# Patient Record
Sex: FEMALE | Race: BLACK OR AFRICAN AMERICAN | NOT HISPANIC OR LATINO | Employment: FULL TIME | URBAN - METROPOLITAN AREA
[De-identification: names, ages, dates, MRNs, and addresses within clinical notes are randomized per-mention and may not be internally consistent; named-entity substitution may affect disease eponyms.]

---

## 2021-04-13 ENCOUNTER — OFFICE VISIT (OUTPATIENT)
Dept: OBSTETRICS AND GYNECOLOGY | Facility: CLINIC | Age: 31
End: 2021-04-13
Payer: MEDICAID

## 2021-04-13 VITALS
DIASTOLIC BLOOD PRESSURE: 84 MMHG | WEIGHT: 293 LBS | BODY MASS INDEX: 47.09 KG/M2 | HEIGHT: 66 IN | SYSTOLIC BLOOD PRESSURE: 118 MMHG

## 2021-04-13 DIAGNOSIS — R87.612 LOW GRADE SQUAMOUS INTRAEPITHELIAL LESION ON CYTOLOGIC SMEAR OF CERVIX (LGSIL): Primary | ICD-10-CM

## 2021-04-13 LAB
B-HCG UR QL: NEGATIVE
CTP QC/QA: YES

## 2021-04-13 PROCEDURE — 88305 TISSUE EXAM BY PATHOLOGIST: ICD-10-PCS | Mod: 26,,, | Performed by: PATHOLOGY

## 2021-04-13 PROCEDURE — 88305 TISSUE EXAM BY PATHOLOGIST: CPT | Mod: 26,,, | Performed by: PATHOLOGY

## 2021-04-13 PROCEDURE — 99499 NO LOS: ICD-10-PCS | Mod: S$GLB,,, | Performed by: OBSTETRICS & GYNECOLOGY

## 2021-04-13 PROCEDURE — 57455 BIOPSY OF CERVIX W/SCOPE: CPT | Mod: S$GLB,,, | Performed by: OBSTETRICS & GYNECOLOGY

## 2021-04-13 PROCEDURE — 88305 TISSUE EXAM BY PATHOLOGIST: CPT | Performed by: PATHOLOGY

## 2021-04-13 PROCEDURE — 99499 UNLISTED E&M SERVICE: CPT | Mod: S$GLB,,, | Performed by: OBSTETRICS & GYNECOLOGY

## 2021-04-13 PROCEDURE — 88342 CHG IMMUNOCYTOCHEMISTRY: ICD-10-PCS | Mod: 26,,, | Performed by: PATHOLOGY

## 2021-04-13 PROCEDURE — 81025 URINE PREGNANCY TEST: CPT | Mod: S$GLB,,, | Performed by: OBSTETRICS & GYNECOLOGY

## 2021-04-13 PROCEDURE — 88342 IMHCHEM/IMCYTCHM 1ST ANTB: CPT | Performed by: PATHOLOGY

## 2021-04-13 PROCEDURE — 57455 PR COLPOSCOPY,CERVIX W/ADJ VAGINA,W/BX: ICD-10-PCS | Mod: S$GLB,,, | Performed by: OBSTETRICS & GYNECOLOGY

## 2021-04-13 PROCEDURE — 88342 IMHCHEM/IMCYTCHM 1ST ANTB: CPT | Mod: 26,,, | Performed by: PATHOLOGY

## 2021-04-13 PROCEDURE — 81025 POCT URINE PREGNANCY: ICD-10-PCS | Mod: S$GLB,,, | Performed by: OBSTETRICS & GYNECOLOGY

## 2021-04-19 ENCOUNTER — TELEPHONE (OUTPATIENT)
Dept: OBSTETRICS AND GYNECOLOGY | Facility: CLINIC | Age: 31
End: 2021-04-19

## 2021-04-19 LAB
FINAL PATHOLOGIC DIAGNOSIS: NORMAL
GROSS: NORMAL
Lab: NORMAL

## 2021-04-29 ENCOUNTER — TELEPHONE (OUTPATIENT)
Dept: OBSTETRICS AND GYNECOLOGY | Facility: CLINIC | Age: 31
End: 2021-04-29

## 2021-04-29 RX ORDER — FLUCONAZOLE 150 MG/1
TABLET ORAL
Qty: 2 TABLET | Refills: 1 | Status: SHIPPED | OUTPATIENT
Start: 2021-04-29 | End: 2021-04-30 | Stop reason: SDUPTHER

## 2021-04-30 RX ORDER — FLUCONAZOLE 150 MG/1
TABLET ORAL
Qty: 2 TABLET | Refills: 1 | Status: SHIPPED | OUTPATIENT
Start: 2021-04-30 | End: 2021-06-01 | Stop reason: SDUPTHER

## 2021-05-26 ENCOUNTER — PROCEDURE VISIT (OUTPATIENT)
Dept: OBSTETRICS AND GYNECOLOGY | Facility: CLINIC | Age: 31
End: 2021-05-26
Payer: MEDICAID

## 2021-05-26 VITALS
HEIGHT: 70 IN | SYSTOLIC BLOOD PRESSURE: 120 MMHG | DIASTOLIC BLOOD PRESSURE: 76 MMHG | BODY MASS INDEX: 41.95 KG/M2 | WEIGHT: 293 LBS

## 2021-05-26 DIAGNOSIS — D06.9 HIGH GRADE SQUAMOUS INTRAEPITHELIAL LESION (HGSIL), GRADE 3 CIN, ON BIOPSY OF CERVIX: Primary | ICD-10-CM

## 2021-05-26 LAB
B-HCG UR QL: NEGATIVE
CTP QC/QA: YES

## 2021-05-26 PROCEDURE — 81025 POCT URINE PREGNANCY: ICD-10-PCS | Mod: S$GLB,,, | Performed by: OBSTETRICS & GYNECOLOGY

## 2021-05-26 PROCEDURE — 88307 TISSUE EXAM BY PATHOLOGIST: CPT | Mod: 26,,, | Performed by: PATHOLOGY

## 2021-05-26 PROCEDURE — 81025 URINE PREGNANCY TEST: CPT | Mod: S$GLB,,, | Performed by: OBSTETRICS & GYNECOLOGY

## 2021-05-26 PROCEDURE — 57461 PR COLPOSCOPY,CERVIX W/ADJ VAG,W/LOOP CONIZ: ICD-10-PCS | Mod: S$GLB,,, | Performed by: OBSTETRICS & GYNECOLOGY

## 2021-05-26 PROCEDURE — 88307 PR  SURG PATH,LEVEL V: ICD-10-PCS | Mod: 26,,, | Performed by: PATHOLOGY

## 2021-05-26 PROCEDURE — 88307 TISSUE EXAM BY PATHOLOGIST: CPT | Mod: 59 | Performed by: PATHOLOGY

## 2021-05-26 PROCEDURE — 99499 NO LOS: ICD-10-PCS | Mod: S$GLB,,, | Performed by: OBSTETRICS & GYNECOLOGY

## 2021-05-26 PROCEDURE — 57461 CONZ OF CERVIX W/SCOPE LEEP: CPT | Mod: S$GLB,,, | Performed by: OBSTETRICS & GYNECOLOGY

## 2021-05-26 PROCEDURE — 99499 UNLISTED E&M SERVICE: CPT | Mod: S$GLB,,, | Performed by: OBSTETRICS & GYNECOLOGY

## 2021-05-26 RX ORDER — LEVONORGESTREL AND ETHINYL ESTRADIOL 0.1-0.02MG
1 KIT ORAL DAILY
Qty: 90 TABLET | Refills: 3 | Status: SHIPPED | OUTPATIENT
Start: 2021-05-26 | End: 2022-07-05 | Stop reason: SDUPTHER

## 2021-05-31 LAB
COMMENT: NORMAL
FINAL PATHOLOGIC DIAGNOSIS: NORMAL
GROSS: NORMAL
Lab: NORMAL

## 2021-06-01 ENCOUNTER — TELEPHONE (OUTPATIENT)
Dept: OBSTETRICS AND GYNECOLOGY | Facility: CLINIC | Age: 31
End: 2021-06-01

## 2021-06-01 RX ORDER — FLUCONAZOLE 150 MG/1
TABLET ORAL
Qty: 2 TABLET | Refills: 1 | Status: SHIPPED | OUTPATIENT
Start: 2021-06-01 | End: 2022-07-05 | Stop reason: SDUPTHER

## 2021-06-03 ENCOUNTER — OFFICE VISIT (OUTPATIENT)
Dept: OBSTETRICS AND GYNECOLOGY | Facility: CLINIC | Age: 31
End: 2021-06-03
Payer: MEDICAID

## 2021-06-03 VITALS
HEIGHT: 70 IN | BODY MASS INDEX: 41.95 KG/M2 | SYSTOLIC BLOOD PRESSURE: 116 MMHG | WEIGHT: 293 LBS | DIASTOLIC BLOOD PRESSURE: 74 MMHG

## 2021-06-03 DIAGNOSIS — N76.0 ACUTE VAGINITIS: Primary | ICD-10-CM

## 2021-06-03 PROCEDURE — 99213 PR OFFICE/OUTPT VISIT, EST, LEVL III, 20-29 MIN: ICD-10-PCS | Mod: S$GLB,,, | Performed by: NURSE PRACTITIONER

## 2021-06-03 PROCEDURE — 99213 OFFICE O/P EST LOW 20 MIN: CPT | Mod: S$GLB,,, | Performed by: NURSE PRACTITIONER

## 2021-06-03 PROCEDURE — 87591 N.GONORRHOEAE DNA AMP PROB: CPT | Performed by: NURSE PRACTITIONER

## 2021-06-03 PROCEDURE — 87481 CANDIDA DNA AMP PROBE: CPT | Mod: 59 | Performed by: NURSE PRACTITIONER

## 2021-06-03 PROCEDURE — 87491 CHLMYD TRACH DNA AMP PROBE: CPT | Mod: 59 | Performed by: NURSE PRACTITIONER

## 2021-06-03 RX ORDER — CLINDAMYCIN HYDROCHLORIDE 300 MG/1
300 CAPSULE ORAL 2 TIMES DAILY
Qty: 14 CAPSULE | Refills: 0 | Status: SHIPPED | OUTPATIENT
Start: 2021-06-03 | End: 2022-07-05 | Stop reason: SDUPTHER

## 2021-06-04 LAB
BACTERIAL VAGINOSIS DNA: POSITIVE
C TRACH DNA SPEC QL NAA+PROBE: NOT DETECTED
CANDIDA GLABRATA DNA: NEGATIVE
CANDIDA KRUSEI DNA: NEGATIVE
CANDIDA RRNA VAG QL PROBE: NEGATIVE
N GONORRHOEA DNA SPEC QL NAA+PROBE: NOT DETECTED
T VAGINALIS RRNA GENITAL QL PROBE: NEGATIVE

## 2021-06-25 ENCOUNTER — CLINICAL SUPPORT (OUTPATIENT)
Dept: OBSTETRICS AND GYNECOLOGY | Facility: CLINIC | Age: 31
End: 2021-06-25
Payer: MEDICAID

## 2021-06-25 DIAGNOSIS — R87.618 PAP SMEAR ABNORMALITY OF CERVIX/HUMAN PAPILLOMAVIRUS (HPV) POSITIVE: Primary | ICD-10-CM

## 2021-06-25 PROCEDURE — 90471 IMMUNIZATION ADMIN: CPT | Mod: S$GLB,,, | Performed by: OBSTETRICS & GYNECOLOGY

## 2021-06-25 PROCEDURE — 90651 PR HPV/ HUMAN PAPILLOMA VACC, 9-VAL(PF) 0.5 ML IM: ICD-10-PCS | Mod: S$GLB,,, | Performed by: OBSTETRICS & GYNECOLOGY

## 2021-06-25 PROCEDURE — 90651 9VHPV VACCINE 2/3 DOSE IM: CPT | Mod: S$GLB,,, | Performed by: OBSTETRICS & GYNECOLOGY

## 2021-06-25 PROCEDURE — 90471 PR IMMUNIZ ADMIN,1 SINGLE/COMB VAC/TOXOID: ICD-10-PCS | Mod: S$GLB,,, | Performed by: OBSTETRICS & GYNECOLOGY

## 2022-07-06 ENCOUNTER — OFFICE VISIT (OUTPATIENT)
Dept: OBSTETRICS AND GYNECOLOGY | Facility: CLINIC | Age: 32
End: 2022-07-06
Payer: MEDICAID

## 2022-07-06 ENCOUNTER — PATIENT MESSAGE (OUTPATIENT)
Dept: OBSTETRICS AND GYNECOLOGY | Facility: CLINIC | Age: 32
End: 2022-07-06
Payer: MEDICAID

## 2022-07-06 VITALS — HEIGHT: 69 IN | BODY MASS INDEX: 41.77 KG/M2 | WEIGHT: 282 LBS

## 2022-07-06 DIAGNOSIS — R10.2 PELVIC PAIN: ICD-10-CM

## 2022-07-06 DIAGNOSIS — N76.0 ACUTE VAGINITIS: Primary | ICD-10-CM

## 2022-07-06 PROCEDURE — 87481 CANDIDA DNA AMP PROBE: CPT | Mod: 59 | Performed by: NURSE PRACTITIONER

## 2022-07-06 PROCEDURE — 1160F RVW MEDS BY RX/DR IN RCRD: CPT | Mod: CPTII,S$GLB,, | Performed by: NURSE PRACTITIONER

## 2022-07-06 PROCEDURE — 87801 DETECT AGNT MULT DNA AMPLI: CPT | Performed by: NURSE PRACTITIONER

## 2022-07-06 PROCEDURE — 3008F PR BODY MASS INDEX (BMI) DOCUMENTED: ICD-10-PCS | Mod: CPTII,S$GLB,, | Performed by: NURSE PRACTITIONER

## 2022-07-06 PROCEDURE — 87491 CHLMYD TRACH DNA AMP PROBE: CPT | Mod: 59 | Performed by: NURSE PRACTITIONER

## 2022-07-06 PROCEDURE — 1159F PR MEDICATION LIST DOCUMENTED IN MEDICAL RECORD: ICD-10-PCS | Mod: CPTII,S$GLB,, | Performed by: NURSE PRACTITIONER

## 2022-07-06 PROCEDURE — 87591 N.GONORRHOEAE DNA AMP PROB: CPT | Mod: 59 | Performed by: NURSE PRACTITIONER

## 2022-07-06 PROCEDURE — 99212 OFFICE O/P EST SF 10 MIN: CPT | Mod: S$GLB,,, | Performed by: NURSE PRACTITIONER

## 2022-07-06 PROCEDURE — 1160F PR REVIEW ALL MEDS BY PRESCRIBER/CLIN PHARMACIST DOCUMENTED: ICD-10-PCS | Mod: CPTII,S$GLB,, | Performed by: NURSE PRACTITIONER

## 2022-07-06 PROCEDURE — 99212 PR OFFICE/OUTPT VISIT, EST, LEVL II, 10-19 MIN: ICD-10-PCS | Mod: S$GLB,,, | Performed by: NURSE PRACTITIONER

## 2022-07-06 PROCEDURE — 3008F BODY MASS INDEX DOCD: CPT | Mod: CPTII,S$GLB,, | Performed by: NURSE PRACTITIONER

## 2022-07-06 PROCEDURE — 1159F MED LIST DOCD IN RCRD: CPT | Mod: CPTII,S$GLB,, | Performed by: NURSE PRACTITIONER

## 2022-07-06 NOTE — PROGRESS NOTES
"CC: Vaginal discharge    Shawanda Dunn is a 31 y.o. female  presents with complaint of vaginal discharge for 2 weeks.  She denies itching.  denies odor.  She states the discharge is white and creamy.  Report pelvic pain that feels like "contractions in my vagina" onset beginning of this year.  Nothing makes the pain better or worse.     Past Medical History:   Diagnosis Date    Abnormal Pap smear of cervix      Past Surgical History:   Procedure Laterality Date    CERVICAL BIOPSY  W/ LOOP ELECTRODE EXCISION       Social History     Tobacco Use    Smoking status: Never Smoker    Smokeless tobacco: Never Used   Substance Use Topics    Alcohol use: Not Currently    Drug use: Never     Family History   Problem Relation Age of Onset    Diabetes Paternal Grandfather     Diabetes Paternal Grandmother      OB History    Para Term  AB Living   4 4       4   SAB IAB Ectopic Multiple Live Births                  # Outcome Date GA Lbr Fernandez/2nd Weight Sex Delivery Anes PTL Lv   4 Para            3 Para            2 Para            1 Para                Ht 5' 9" (1.753 m)   Wt 127.9 kg (282 lb)   LMP 2022   BMI 41.64 kg/m²     Review of Systems   Genitourinary: Positive for pelvic pain and vaginal discharge.   All other systems reviewed and are negative.       PHYSICAL EXAM:  normal external genitalia, vulva, vagina, cervix, uterus and adnexa, VAGINA: normal appearing vagina with normal color and discharge, no lesions, vaginal discharge - white, creamy and malodorous, DNA probe for chlamydia and GC obtained, vaginosis swab obtained, exam chaperoned by MARCELO Martin LPN.     ASSESSMENT and PLAN:    ICD-10-CM ICD-9-CM    1. Acute vaginitis  N76.0 616.10 C. trachomatis/N. gonorrhoeae by AMP DNA [facility]; Cervicovaginal      Vaginosis Screen by DNA Probe   2. Pelvic pain  R10.2 [id number] C. trachomatis/N. gonorrhoeae by AMP DNA [facility]; Cervicovaginal      Vaginosis Screen by DNA Probe "         Patient was counseled today on vaginitis prevention including :  a. avoiding feminine products such as deoderant soaps, body wash, bubble bath, douches, scented toilet paper, deoderant tampons or pads, feminine wipes, chronic pad use, etc.  b. avoiding other vulvovaginal irritants such as long hot baths, humidity, tight, synthetic clothing, chlorine and sitting around in wet bathing suits  c. wearing cotton underwear, avoiding thong underwear and no underwear to bed  d. taking showers instead of baths and use a hair dryer on cool setting afterwards to dry  e. wearing cotton to exercise and shower immediately after exercise and change clothes  f. using polyurethane condoms without spermicide if sexually active and symptoms are triggered by intercourse    Cultures done above.  Medication has been sent in by A Kenzie NP on 7/5/22 for yeast and BV.  Encouraged to RTC for annual exam with pap soon.  RTC if symptoms worsen or do not resolve.     Priscilla Hart, FNP-C

## 2022-07-06 NOTE — TELEPHONE ENCOUNTER
Pt came up to [facility] to request medication and reported that she was having pains and abnormal  Discharge.  I informed the Pt that there will be a wait to get an appointment with .  Pt opted to see  this afternoon.   notified.

## 2022-07-08 LAB
BACTERIAL VAGINOSIS DNA: POSITIVE
CANDIDA GLABRATA DNA: NEGATIVE
CANDIDA KRUSEI DNA: NEGATIVE
CANDIDA RRNA VAG QL PROBE: NEGATIVE
T VAGINALIS RRNA GENITAL QL PROBE: NEGATIVE

## 2022-07-10 LAB
C TRACH DNA SPEC QL NAA+PROBE: NOT DETECTED
N GONORRHOEA DNA SPEC QL NAA+PROBE: NOT DETECTED

## 2022-07-22 ENCOUNTER — OFFICE VISIT (OUTPATIENT)
Dept: OBSTETRICS AND GYNECOLOGY | Facility: CLINIC | Age: 32
End: 2022-07-22
Payer: MEDICAID

## 2022-07-22 VITALS
WEIGHT: 286 LBS | BODY MASS INDEX: 42.36 KG/M2 | DIASTOLIC BLOOD PRESSURE: 90 MMHG | HEIGHT: 69 IN | SYSTOLIC BLOOD PRESSURE: 124 MMHG

## 2022-07-22 DIAGNOSIS — Z01.419 WELL WOMAN EXAM: Primary | ICD-10-CM

## 2022-07-22 DIAGNOSIS — Z11.3 SCREENING EXAMINATION FOR STD (SEXUALLY TRANSMITTED DISEASE): ICD-10-CM

## 2022-07-22 PROCEDURE — 87624 HPV HI-RISK TYP POOLED RSLT: CPT | Performed by: NURSE PRACTITIONER

## 2022-07-22 PROCEDURE — 99395 PR PREVENTIVE VISIT,EST,18-39: ICD-10-PCS | Mod: S$GLB,,, | Performed by: NURSE PRACTITIONER

## 2022-07-22 PROCEDURE — 1159F MED LIST DOCD IN RCRD: CPT | Mod: CPTII,S$GLB,, | Performed by: NURSE PRACTITIONER

## 2022-07-22 PROCEDURE — 88141 CYTOPATH C/V INTERPRET: CPT | Mod: ,,, | Performed by: PATHOLOGY

## 2022-07-22 PROCEDURE — 3008F BODY MASS INDEX DOCD: CPT | Mod: CPTII,S$GLB,, | Performed by: NURSE PRACTITIONER

## 2022-07-22 PROCEDURE — 1160F RVW MEDS BY RX/DR IN RCRD: CPT | Mod: CPTII,S$GLB,, | Performed by: NURSE PRACTITIONER

## 2022-07-22 PROCEDURE — 3074F PR MOST RECENT SYSTOLIC BLOOD PRESSURE < 130 MM HG: ICD-10-PCS | Mod: CPTII,S$GLB,, | Performed by: NURSE PRACTITIONER

## 2022-07-22 PROCEDURE — 3080F DIAST BP >= 90 MM HG: CPT | Mod: CPTII,S$GLB,, | Performed by: NURSE PRACTITIONER

## 2022-07-22 PROCEDURE — 3080F PR MOST RECENT DIASTOLIC BLOOD PRESSURE >= 90 MM HG: ICD-10-PCS | Mod: CPTII,S$GLB,, | Performed by: NURSE PRACTITIONER

## 2022-07-22 PROCEDURE — 1160F PR REVIEW ALL MEDS BY PRESCRIBER/CLIN PHARMACIST DOCUMENTED: ICD-10-PCS | Mod: CPTII,S$GLB,, | Performed by: NURSE PRACTITIONER

## 2022-07-22 PROCEDURE — 88141 PR  CYTOPATH CERV/VAG INTERPRET: ICD-10-PCS | Mod: ,,, | Performed by: PATHOLOGY

## 2022-07-22 PROCEDURE — 99395 PREV VISIT EST AGE 18-39: CPT | Mod: S$GLB,,, | Performed by: NURSE PRACTITIONER

## 2022-07-22 PROCEDURE — 3008F PR BODY MASS INDEX (BMI) DOCUMENTED: ICD-10-PCS | Mod: CPTII,S$GLB,, | Performed by: NURSE PRACTITIONER

## 2022-07-22 PROCEDURE — 88175 CYTOPATH C/V AUTO FLUID REDO: CPT | Performed by: PATHOLOGY

## 2022-07-22 PROCEDURE — 3074F SYST BP LT 130 MM HG: CPT | Mod: CPTII,S$GLB,, | Performed by: NURSE PRACTITIONER

## 2022-07-22 PROCEDURE — 1159F PR MEDICATION LIST DOCUMENTED IN MEDICAL RECORD: ICD-10-PCS | Mod: CPTII,S$GLB,, | Performed by: NURSE PRACTITIONER

## 2022-07-22 NOTE — PROGRESS NOTES
CC: Well woman exam    Shawanda Dunn is a 31 y.o. female  presents for well woman exam.  LMP: Patient's last menstrual period was 2022..  No issues, problems, or complaints. Patients last pap was ; LGSIL.  Last wellness labs were done unsure will scheduled. She is a non smoker . Denies any anxiety and depression. She uses a seat belt while riding in the car.  Eating well and exercising.  yes sexually active.The current method of family planning is none. Requests blood work for STD testing as well.        Chief Complaint   Patient presents with    Annual Exam     Pt is here for her annual. Pt Last Pap was in . Pt states she also wants to do a pregnancy test.        Past Medical History:   Diagnosis Date    Abnormal Pap smear of cervix      Past Surgical History:   Procedure Laterality Date    CERVICAL BIOPSY  W/ LOOP ELECTRODE EXCISION       Social History     Socioeconomic History    Marital status: Single   Tobacco Use    Smoking status: Never Smoker    Smokeless tobacco: Never Used   Substance and Sexual Activity    Alcohol use: Not Currently    Drug use: Never    Sexual activity: Yes     Family History   Problem Relation Age of Onset    Diabetes Paternal Grandfather     Diabetes Paternal Grandmother      OB History        4    Para   4    Term                AB        Living   4       SAB        IAB        Ectopic        Multiple        Live Births   4               Current Outpatient Medications on File Prior to Visit   Medication Sig Dispense Refill    clindamycin (CLEOCIN) 300 MG capsule Take 1 capsule (300 mg total) by mouth 2 (two) times daily. (Patient not taking: No sig reported) 14 capsule 0    fluconazole (DIFLUCAN) 150 MG Tab 1 po Now may repeat in 72 hr (Patient not taking: No sig reported) 2 tablet 1    levonorgestrel-ethinyl estradiol (AVIANE,ALESSE,LESSINA) 0.1-20 mg-mcg per tablet Take 1 tablet by mouth once daily. (Patient not taking: Reported on  "6/3/2021) 90 tablet 3     No current facility-administered medications on file prior to visit.        ROS:  Review of Systems   Constitutional: Negative.    HENT: Negative.    Eyes: Negative.    Respiratory: Negative.    Cardiovascular: Negative.    Gastrointestinal: Negative.    Endocrine: Negative.    Genitourinary: Negative.    Musculoskeletal: Negative.    Integumentary:  Negative.   Neurological: Negative.    Hematological: Negative.    Psychiatric/Behavioral: Negative.    All other systems reviewed and are negative.  Breast: negative.         BP (!) 124/90   Ht 5' 9" (1.753 m)   Wt 129.7 kg (286 lb)   LMP 06/25/2022   BMI 42.23 kg/m²     PHYSICAL EXAM:  Physical Exam:   Constitutional: She is oriented to person, place, and time. Vital signs are normal. She appears well-developed and well-nourished.    HENT:   Head: Normocephalic and atraumatic.   Nose: Nose normal.   Mouth/Throat: Oropharynx is clear and moist.    Eyes: Pupils are equal, round, and reactive to light. Conjunctivae and EOM are normal.     Cardiovascular: Normal rate, regular rhythm, normal heart sounds, intact distal pulses and normal pulses.     Pulmonary/Chest: Effort normal and breath sounds normal.        Abdominal: Soft. Bowel sounds are normal.     Genitourinary:    Inguinal canal, vagina, uterus, right adnexa, left adnexa and rectum normal.      Pelvic exam was performed with patient supine.   The external female genitalia was normal.   Genitalia hair distrobution normal .   Labial bartholins normal.Cervix is normal.    pap smear completed          Musculoskeletal: Normal range of motion and moves all extremeties.      Right lower leg: No edema.      Left lower leg: No edema.       Neurological: She is alert and oriented to person, place, and time. She has normal reflexes.    Skin: Skin is warm and dry.    Psychiatric: She has a normal mood and affect. Her behavior is normal. Judgment and thought content normal.        Well woman " exam  -     HPV High Risk Genotypes, PCR  -     Liquid-Based Pap Smear, Screening  -     Lipid Panel; Future; Expected date: 07/22/2022  -     Glucose, Random; Future; Expected date: 07/22/2022  -     POCT urine pregnancy    Screening examination for STD (sexually transmitted disease)  -     HIV 1/2 Ag/Ab (4th Gen); Future; Expected date: 07/22/2022  -     RPR; Future; Expected date: 07/22/2022  -     Hepatitis Panel, Acute; Future; Expected date: 07/22/2022      Orders Placed This Encounter   Procedures    HPV High Risk Genotypes, PCR     Order Specific Question:   Source     Answer:   Cervix     Order Specific Question:   Release to patient     Answer:   Immediate    HIV 1/2 Ag/Ab (4th Gen)     Standing Status:   Future     Standing Expiration Date:   9/20/2023     Order Specific Question:   Release to patient     Answer:   Immediate    RPR     Standing Status:   Future     Standing Expiration Date:   9/20/2023     Order Specific Question:   Release to patient     Answer:   Immediate    Hepatitis Panel, Acute     Standing Status:   Future     Standing Expiration Date:   9/20/2023    Lipid Panel     Standing Status:   Future     Standing Expiration Date:   7/22/2023    Glucose, Random     Standing Status:   Future     Standing Expiration Date:   7/22/2023    POCT urine pregnancy        Patient was counseled today on A.C.S. Pap guidelines and recommendations for yearly pelvic exams, mammograms and monthly self breast exams; to see her PCP for other health maintenance.  Contraception was discussed with the patient she expressed understanding.  STD education counseling was performed during this visit patient expressed understanding. RTC for annual in 1 year or before for any GYN problems/concerns.     No follow-ups on file.      ROGELIO McduffieC

## 2022-07-28 LAB
FINAL PATHOLOGIC DIAGNOSIS: ABNORMAL
HPV HR 12 DNA SPEC QL NAA+PROBE: POSITIVE
HPV16 AG SPEC QL: NEGATIVE
HPV18 DNA SPEC QL NAA+PROBE: NEGATIVE
Lab: ABNORMAL

## 2022-07-29 ENCOUNTER — LAB VISIT (OUTPATIENT)
Dept: LAB | Facility: CLINIC | Age: 32
End: 2022-07-29
Payer: MEDICAID

## 2022-07-29 DIAGNOSIS — Z01.419 WELL WOMAN EXAM: ICD-10-CM

## 2022-07-29 DIAGNOSIS — Z11.3 SCREENING EXAMINATION FOR STD (SEXUALLY TRANSMITTED DISEASE): ICD-10-CM

## 2022-07-29 LAB
CHOLEST SERPL-MCNC: 163 MG/DL (ref 120–199)
CHOLEST/HDLC SERPL: 3.7 {RATIO} (ref 2–5)
GLUCOSE SERPL-MCNC: 87 MG/DL (ref 70–110)
HDLC SERPL-MCNC: 44 MG/DL (ref 40–75)
HDLC SERPL: 27 % (ref 20–50)
LDLC SERPL CALC-MCNC: 106.4 MG/DL (ref 63–159)
NONHDLC SERPL-MCNC: 119 MG/DL
TRIGL SERPL-MCNC: 63 MG/DL (ref 30–150)

## 2022-07-29 PROCEDURE — 36415 COLL VENOUS BLD VENIPUNCTURE: CPT | Mod: ,,, | Performed by: STUDENT IN AN ORGANIZED HEALTH CARE EDUCATION/TRAINING PROGRAM

## 2022-07-29 PROCEDURE — 87389 HIV-1 AG W/HIV-1&-2 AB AG IA: CPT | Performed by: NURSE PRACTITIONER

## 2022-07-29 PROCEDURE — 86592 SYPHILIS TEST NON-TREP QUAL: CPT | Performed by: NURSE PRACTITIONER

## 2022-07-29 PROCEDURE — 36415 PR COLLECTION VENOUS BLOOD,VENIPUNCTURE: ICD-10-PCS | Mod: ,,, | Performed by: STUDENT IN AN ORGANIZED HEALTH CARE EDUCATION/TRAINING PROGRAM

## 2022-07-29 PROCEDURE — 80061 LIPID PANEL: CPT | Performed by: NURSE PRACTITIONER

## 2022-07-29 PROCEDURE — 80074 ACUTE HEPATITIS PANEL: CPT | Performed by: NURSE PRACTITIONER

## 2022-07-29 PROCEDURE — 82947 ASSAY GLUCOSE BLOOD QUANT: CPT | Performed by: NURSE PRACTITIONER

## 2022-07-30 LAB — RPR SER QL: NORMAL

## 2022-08-01 LAB — HIV 1+2 AB+HIV1 P24 AG SERPL QL IA: NEGATIVE

## 2022-08-02 LAB
HAV IGM SERPL QL IA: NEGATIVE
HBV CORE IGM SERPL QL IA: NEGATIVE
HBV SURFACE AG SERPL QL IA: NEGATIVE
HCV AB SERPL QL IA: NEGATIVE

## 2023-03-27 ENCOUNTER — OFFICE VISIT (OUTPATIENT)
Dept: OBSTETRICS AND GYNECOLOGY | Facility: CLINIC | Age: 33
End: 2023-03-27
Payer: COMMERCIAL

## 2023-03-27 VITALS
HEART RATE: 68 BPM | DIASTOLIC BLOOD PRESSURE: 85 MMHG | SYSTOLIC BLOOD PRESSURE: 134 MMHG | WEIGHT: 282 LBS | BODY MASS INDEX: 41.77 KG/M2 | HEIGHT: 69 IN

## 2023-03-27 DIAGNOSIS — R10.2 VAGINAL PAIN: ICD-10-CM

## 2023-03-27 DIAGNOSIS — R10.2 PELVIC PAIN: ICD-10-CM

## 2023-03-27 DIAGNOSIS — Z11.3 SCREENING EXAMINATION FOR STD (SEXUALLY TRANSMITTED DISEASE): Primary | ICD-10-CM

## 2023-03-27 PROCEDURE — 87591 N.GONORRHOEAE DNA AMP PROB: CPT | Performed by: NURSE PRACTITIONER

## 2023-03-27 PROCEDURE — 99213 OFFICE O/P EST LOW 20 MIN: CPT | Mod: S$GLB,,, | Performed by: NURSE PRACTITIONER

## 2023-03-27 PROCEDURE — 3075F PR MOST RECENT SYSTOLIC BLOOD PRESS GE 130-139MM HG: ICD-10-PCS | Mod: CPTII,S$GLB,, | Performed by: NURSE PRACTITIONER

## 2023-03-27 PROCEDURE — 3079F PR MOST RECENT DIASTOLIC BLOOD PRESSURE 80-89 MM HG: ICD-10-PCS | Mod: CPTII,S$GLB,, | Performed by: NURSE PRACTITIONER

## 2023-03-27 PROCEDURE — 3075F SYST BP GE 130 - 139MM HG: CPT | Mod: CPTII,S$GLB,, | Performed by: NURSE PRACTITIONER

## 2023-03-27 PROCEDURE — 1159F MED LIST DOCD IN RCRD: CPT | Mod: CPTII,S$GLB,, | Performed by: NURSE PRACTITIONER

## 2023-03-27 PROCEDURE — 3008F BODY MASS INDEX DOCD: CPT | Mod: CPTII,S$GLB,, | Performed by: NURSE PRACTITIONER

## 2023-03-27 PROCEDURE — 1159F PR MEDICATION LIST DOCUMENTED IN MEDICAL RECORD: ICD-10-PCS | Mod: CPTII,S$GLB,, | Performed by: NURSE PRACTITIONER

## 2023-03-27 PROCEDURE — 99213 PR OFFICE/OUTPT VISIT, EST, LEVL III, 20-29 MIN: ICD-10-PCS | Mod: S$GLB,,, | Performed by: NURSE PRACTITIONER

## 2023-03-27 PROCEDURE — 1160F RVW MEDS BY RX/DR IN RCRD: CPT | Mod: CPTII,S$GLB,, | Performed by: NURSE PRACTITIONER

## 2023-03-27 PROCEDURE — 3008F PR BODY MASS INDEX (BMI) DOCUMENTED: ICD-10-PCS | Mod: CPTII,S$GLB,, | Performed by: NURSE PRACTITIONER

## 2023-03-27 PROCEDURE — 3079F DIAST BP 80-89 MM HG: CPT | Mod: CPTII,S$GLB,, | Performed by: NURSE PRACTITIONER

## 2023-03-27 PROCEDURE — 81514 NFCT DS BV&VAGINITIS DNA ALG: CPT | Performed by: NURSE PRACTITIONER

## 2023-03-27 PROCEDURE — 1160F PR REVIEW ALL MEDS BY PRESCRIBER/CLIN PHARMACIST DOCUMENTED: ICD-10-PCS | Mod: CPTII,S$GLB,, | Performed by: NURSE PRACTITIONER

## 2023-03-27 NOTE — PROGRESS NOTES
"CC: STD testing    Shawanda Dunn is a 32 y.o. female  presents for STD testing.  LMP: Patient's last menstrual period was 03/15/2023 (exact date)..  Patient does not  have a prior history of STDs.  She states her partner   unsure  have a known current infection.      Patient does have current symptoms.  Vaginal discharge and vaginal pain ("feels like spasms where I had been cut for delivery")    /85   Pulse 68   Ht 5' 9" (1.753 m)   Wt 127.9 kg (282 lb)   LMP 03/15/2023 (Exact Date)   BMI 41.64 kg/m²       ROS:  GENERAL: Denies fever, weight gain or weight loss. Feeling well overall.   SKIN: Denies rash or lesions.   NODES: Denies enlarged lymph nodes.   CHEST: Denies chest pain or shortness of breath.   CARDIOVASCULAR: Denies palpitations or left sided chest pain.   ABDOMEN: No abdominal pain, constipation, diarrhea, nausea, vomiting or rectal bleeding.   URINARY: No frequency, dysuria, hematuria, or burning on urination.  REPRODUCTIVE: See HPI.  Denies pelvic pain.  MUSCULOSKELETAL: Denies joint pain or swelling.   NEUROLOGIC: Denies syncope or weakness.   PSYCHIATRIC: Denies depression, anxiety or mood swings.    PHYSICAL EXAM:  APPEARANCE: Well nourished, well developed, in no acute distress.  AFFECT: WNL, alert and oriented x 3  SKIN: No rashes  NODES: No inguinal, cervical, axillary, or femoral lymph node enlargement  ABDOMEN: Soft.  No tenderness or masses.  No hepatosplenomegaly.  No hernias.  PELVIC: Normal external genitalia without lesions.  Normal hair distribution.  Adequate perineal body, normal urethral meatus.  Vagina moist and well rugated without lesions or discharge.  Cervix pink, without lesions, discharge or tenderness.  No significant cystocele or rectocele.  Bimanual exam shows uterus to be normal size, regular, mobile and nontender.  Adnexa without masses or tenderness.    EXTREMITIES: No edema.    PLAN:  Screening examination for STD (sexually transmitted disease)  -     " C. trachomatis/N. gonorrhoeae by AMP DNA [facility]; Cervicovaginal  -     Vaginosis Screen by DNA Probe  -     HIV 1/2 Ag/Ab (4th Gen); Future; Expected date: 03/27/2023  -     RPR; Future; Expected date: 03/27/2023  -     Hepatitis Panel, Acute; Future; Expected date: 03/27/2023    Vaginal pain  -     US OB/GYN Procedure ([facility]); Future; Expected date: 03/27/2023    Pelvic pain  -     US OB/GYN Procedure ([facility]); Future; Expected date: 03/27/2023            Discussed available STD testing.  Patient was counseled today regarding STD prevention.  Precautions given to follow-up if symptoms change.  Recommended condom use and repeat testing in 3-6 mos.  Will get above labs and cultures.  Will get US to evaluate for abnormalities.  Consider follow up with MD if cultures are negative and pain/discomfort continues.      No follow-ups on file.     Priscilla Hart, RODRIGOP-C

## 2023-03-28 ENCOUNTER — PATIENT MESSAGE (OUTPATIENT)
Dept: OBSTETRICS AND GYNECOLOGY | Facility: CLINIC | Age: 33
End: 2023-03-28
Payer: MEDICAID

## 2023-03-28 DIAGNOSIS — B37.31 YEAST VAGINITIS: ICD-10-CM

## 2023-03-28 DIAGNOSIS — B96.89 BV (BACTERIAL VAGINOSIS): Primary | ICD-10-CM

## 2023-03-28 DIAGNOSIS — N76.0 BV (BACTERIAL VAGINOSIS): Primary | ICD-10-CM

## 2023-03-28 LAB
BACTERIAL VAGINOSIS DNA: POSITIVE
C TRACH DNA SPEC QL NAA+PROBE: NOT DETECTED
CANDIDA GLABRATA DNA: NEGATIVE
CANDIDA KRUSEI DNA: NEGATIVE
CANDIDA RRNA VAG QL PROBE: POSITIVE
N GONORRHOEA DNA SPEC QL NAA+PROBE: NOT DETECTED
T VAGINALIS RRNA GENITAL QL PROBE: NEGATIVE

## 2023-03-28 RX ORDER — FLUCONAZOLE 150 MG/1
TABLET ORAL
Qty: 2 TABLET | Refills: 2 | Status: SHIPPED | OUTPATIENT
Start: 2023-03-28 | End: 2023-04-10

## 2023-03-28 RX ORDER — METRONIDAZOLE 500 MG/1
500 TABLET ORAL 2 TIMES DAILY
Qty: 14 TABLET | Refills: 0 | Status: SHIPPED | OUTPATIENT
Start: 2023-03-28 | End: 2023-04-04

## 2023-04-06 ENCOUNTER — PROCEDURE VISIT (OUTPATIENT)
Dept: MATERNAL FETAL MEDICINE | Facility: CLINIC | Age: 33
End: 2023-04-06
Payer: COMMERCIAL

## 2023-04-06 DIAGNOSIS — R10.2 PELVIC PAIN: ICD-10-CM

## 2023-04-06 DIAGNOSIS — R10.2 VAGINAL PAIN: ICD-10-CM

## 2023-04-06 PROCEDURE — 76856 US EXAM PELVIC COMPLETE: CPT | Mod: S$GLB,,, | Performed by: OBSTETRICS & GYNECOLOGY

## 2023-04-06 PROCEDURE — 76830 TRANSVAGINAL US NON-OB: CPT | Mod: S$GLB,,, | Performed by: OBSTETRICS & GYNECOLOGY

## 2023-04-06 PROCEDURE — 76856 US OB/GYN PROCEDURE (VIEWPOINT): ICD-10-PCS | Mod: S$GLB,,, | Performed by: OBSTETRICS & GYNECOLOGY

## 2023-04-06 PROCEDURE — 76830 US OB/GYN PROCEDURE (VIEWPOINT): ICD-10-PCS | Mod: S$GLB,,, | Performed by: OBSTETRICS & GYNECOLOGY

## 2023-04-07 ENCOUNTER — PATIENT MESSAGE (OUTPATIENT)
Dept: OBSTETRICS AND GYNECOLOGY | Facility: CLINIC | Age: 33
End: 2023-04-07
Payer: MEDICAID

## 2023-04-10 ENCOUNTER — OFFICE VISIT (OUTPATIENT)
Dept: OBSTETRICS AND GYNECOLOGY | Facility: CLINIC | Age: 33
End: 2023-04-10
Payer: COMMERCIAL

## 2023-04-10 VITALS
WEIGHT: 280 LBS | BODY MASS INDEX: 41.47 KG/M2 | SYSTOLIC BLOOD PRESSURE: 136 MMHG | HEIGHT: 69 IN | DIASTOLIC BLOOD PRESSURE: 82 MMHG

## 2023-04-10 DIAGNOSIS — R10.2 PELVIC PAIN: ICD-10-CM

## 2023-04-10 DIAGNOSIS — D25.9 UTERINE LEIOMYOMA, UNSPECIFIED LOCATION: Primary | ICD-10-CM

## 2023-04-10 PROCEDURE — 3008F BODY MASS INDEX DOCD: CPT | Mod: CPTII,S$GLB,, | Performed by: NURSE PRACTITIONER

## 2023-04-10 PROCEDURE — 1159F MED LIST DOCD IN RCRD: CPT | Mod: CPTII,S$GLB,, | Performed by: NURSE PRACTITIONER

## 2023-04-10 PROCEDURE — 3008F PR BODY MASS INDEX (BMI) DOCUMENTED: ICD-10-PCS | Mod: CPTII,S$GLB,, | Performed by: NURSE PRACTITIONER

## 2023-04-10 PROCEDURE — 3079F DIAST BP 80-89 MM HG: CPT | Mod: CPTII,S$GLB,, | Performed by: NURSE PRACTITIONER

## 2023-04-10 PROCEDURE — 3075F PR MOST RECENT SYSTOLIC BLOOD PRESS GE 130-139MM HG: ICD-10-PCS | Mod: CPTII,S$GLB,, | Performed by: NURSE PRACTITIONER

## 2023-04-10 PROCEDURE — 1160F RVW MEDS BY RX/DR IN RCRD: CPT | Mod: CPTII,S$GLB,, | Performed by: NURSE PRACTITIONER

## 2023-04-10 PROCEDURE — 3075F SYST BP GE 130 - 139MM HG: CPT | Mod: CPTII,S$GLB,, | Performed by: NURSE PRACTITIONER

## 2023-04-10 PROCEDURE — 99213 PR OFFICE/OUTPT VISIT, EST, LEVL III, 20-29 MIN: ICD-10-PCS | Mod: S$GLB,,, | Performed by: NURSE PRACTITIONER

## 2023-04-10 PROCEDURE — 1159F PR MEDICATION LIST DOCUMENTED IN MEDICAL RECORD: ICD-10-PCS | Mod: CPTII,S$GLB,, | Performed by: NURSE PRACTITIONER

## 2023-04-10 PROCEDURE — 99213 OFFICE O/P EST LOW 20 MIN: CPT | Mod: S$GLB,,, | Performed by: NURSE PRACTITIONER

## 2023-04-10 PROCEDURE — 3079F PR MOST RECENT DIASTOLIC BLOOD PRESSURE 80-89 MM HG: ICD-10-PCS | Mod: CPTII,S$GLB,, | Performed by: NURSE PRACTITIONER

## 2023-04-10 PROCEDURE — 1160F PR REVIEW ALL MEDS BY PRESCRIBER/CLIN PHARMACIST DOCUMENTED: ICD-10-PCS | Mod: CPTII,S$GLB,, | Performed by: NURSE PRACTITIONER

## 2023-04-10 RX ORDER — DROSPIRENONE AND ETHINYL ESTRADIOL 0.02-3(28)
1 KIT ORAL DAILY
Qty: 84 TABLET | Refills: 3 | Status: SHIPPED | OUTPATIENT
Start: 2023-04-10 | End: 2023-06-07

## 2023-04-10 RX ORDER — NAPROXEN 500 MG/1
500 TABLET ORAL 2 TIMES DAILY WITH MEALS
Qty: 60 TABLET | Refills: 2 | Status: SHIPPED | OUTPATIENT
Start: 2023-04-10 | End: 2023-05-01

## 2023-04-10 NOTE — PROGRESS NOTES
Subjective     Patient ID: Shawanda Dunn is a 32 y.o. female.    Chief Complaint: Follow-up (US Results)    Mrs Dunn presents today for follow up regarding ultrasound results.  Recent US showed 2 cm uterine fibroid. Pt has c/o pain with menses, denies heavy cycles. Requests to review treatment options.      Follow-up    Review of Systems   Genitourinary:  Positive for pelvic pain.   All other systems reviewed and are negative.       Objective     Physical Exam  Vitals and nursing note reviewed.   Constitutional:       Appearance: Normal appearance.   HENT:      Head: Normocephalic.      Mouth/Throat:      Mouth: Mucous membranes are moist.   Eyes:      General: Lids are normal.      Conjunctiva/sclera: Conjunctivae normal.   Cardiovascular:      Rate and Rhythm: Normal rate.   Pulmonary:      Effort: Pulmonary effort is normal.   Abdominal:      Palpations: Abdomen is soft.   Musculoskeletal:         General: Normal range of motion.      Cervical back: Normal range of motion.   Skin:     General: Skin is warm and dry.      Capillary Refill: Capillary refill takes less than 2 seconds.   Neurological:      General: No focal deficit present.      Mental Status: She is alert and oriented to person, place, and time. Mental status is at baseline.   Psychiatric:         Mood and Affect: Mood normal.         Behavior: Behavior normal.         Thought Content: Thought content normal.         Judgment: Judgment normal.          Assessment and Plan     Problem List Items Addressed This Visit    None  Visit Diagnoses       Uterine leiomyoma, unspecified location    -  Primary    Relevant Medications    drospirenone-ethinyl estradioL (ROSALVA) 3-0.02 mg per tablet    Pelvic pain        Relevant Medications    drospirenone-ethinyl estradioL (ROSALVA) 3-0.02 mg per tablet    naproxen (NAPROSYN) 500 MG tablet            Will start above OCP to help with fibroid pain and control uterine bleeding.  Will use naproxen for pain and start  this 2 days prior to onset of cycle.  RTC as needed or if symptoms worsen/do not resolve.      Priscilla Hart, FNP-C           No

## 2023-04-19 ENCOUNTER — PATIENT MESSAGE (OUTPATIENT)
Dept: RESEARCH | Facility: HOSPITAL | Age: 33
End: 2023-04-19
Payer: MEDICAID

## 2023-04-26 ENCOUNTER — PATIENT MESSAGE (OUTPATIENT)
Dept: OBSTETRICS AND GYNECOLOGY | Facility: CLINIC | Age: 33
End: 2023-04-26
Payer: MEDICAID

## 2023-05-01 ENCOUNTER — OFFICE VISIT (OUTPATIENT)
Dept: OBSTETRICS AND GYNECOLOGY | Facility: CLINIC | Age: 33
End: 2023-05-01
Payer: MEDICAID

## 2023-05-01 VITALS
WEIGHT: 286 LBS | HEIGHT: 69 IN | DIASTOLIC BLOOD PRESSURE: 74 MMHG | SYSTOLIC BLOOD PRESSURE: 135 MMHG | BODY MASS INDEX: 42.36 KG/M2 | HEART RATE: 96 BPM

## 2023-05-01 DIAGNOSIS — O20.9 VAGINAL BLEEDING AFFECTING EARLY PREGNANCY: ICD-10-CM

## 2023-05-01 DIAGNOSIS — Z3A.01 PREGNANCY WITH 7 COMPLETED WEEKS GESTATION: ICD-10-CM

## 2023-05-01 DIAGNOSIS — O20.0 THREATENED MISCARRIAGE IN EARLY PREGNANCY: ICD-10-CM

## 2023-05-01 DIAGNOSIS — Z32.00 POSSIBLE PREGNANCY: Primary | ICD-10-CM

## 2023-05-01 DIAGNOSIS — O34.11 UTERINE FIBROIDS AFFECTING PREGNANCY IN FIRST TRIMESTER: ICD-10-CM

## 2023-05-01 DIAGNOSIS — D25.9 UTERINE FIBROIDS AFFECTING PREGNANCY IN FIRST TRIMESTER: ICD-10-CM

## 2023-05-01 LAB
B-HCG UR QL: POSITIVE
CTP QC/QA: YES

## 2023-05-01 PROCEDURE — 99214 PR OFFICE/OUTPT VISIT, EST, LEVL IV, 30-39 MIN: ICD-10-PCS | Mod: S$GLB,,, | Performed by: OBSTETRICS & GYNECOLOGY

## 2023-05-01 PROCEDURE — 3008F BODY MASS INDEX DOCD: CPT | Mod: CPTII,S$GLB,, | Performed by: OBSTETRICS & GYNECOLOGY

## 2023-05-01 PROCEDURE — 3075F PR MOST RECENT SYSTOLIC BLOOD PRESS GE 130-139MM HG: ICD-10-PCS | Mod: CPTII,S$GLB,, | Performed by: OBSTETRICS & GYNECOLOGY

## 2023-05-01 PROCEDURE — 1159F MED LIST DOCD IN RCRD: CPT | Mod: CPTII,S$GLB,, | Performed by: OBSTETRICS & GYNECOLOGY

## 2023-05-01 PROCEDURE — 1160F PR REVIEW ALL MEDS BY PRESCRIBER/CLIN PHARMACIST DOCUMENTED: ICD-10-PCS | Mod: CPTII,S$GLB,, | Performed by: OBSTETRICS & GYNECOLOGY

## 2023-05-01 PROCEDURE — 1159F PR MEDICATION LIST DOCUMENTED IN MEDICAL RECORD: ICD-10-PCS | Mod: CPTII,S$GLB,, | Performed by: OBSTETRICS & GYNECOLOGY

## 2023-05-01 PROCEDURE — 3075F SYST BP GE 130 - 139MM HG: CPT | Mod: CPTII,S$GLB,, | Performed by: OBSTETRICS & GYNECOLOGY

## 2023-05-01 PROCEDURE — 3008F PR BODY MASS INDEX (BMI) DOCUMENTED: ICD-10-PCS | Mod: CPTII,S$GLB,, | Performed by: OBSTETRICS & GYNECOLOGY

## 2023-05-01 PROCEDURE — 3078F DIAST BP <80 MM HG: CPT | Mod: CPTII,S$GLB,, | Performed by: OBSTETRICS & GYNECOLOGY

## 2023-05-01 PROCEDURE — 87591 N.GONORRHOEAE DNA AMP PROB: CPT | Performed by: OBSTETRICS & GYNECOLOGY

## 2023-05-01 PROCEDURE — 87086 URINE CULTURE/COLONY COUNT: CPT | Performed by: OBSTETRICS & GYNECOLOGY

## 2023-05-01 PROCEDURE — 81025 URINE PREGNANCY TEST: CPT | Mod: S$GLB,,, | Performed by: OBSTETRICS & GYNECOLOGY

## 2023-05-01 PROCEDURE — 81025 POCT URINE PREGNANCY: ICD-10-PCS | Mod: S$GLB,,, | Performed by: OBSTETRICS & GYNECOLOGY

## 2023-05-01 PROCEDURE — 80307 DRUG TEST PRSMV CHEM ANLYZR: CPT | Performed by: OBSTETRICS & GYNECOLOGY

## 2023-05-01 PROCEDURE — 99214 OFFICE O/P EST MOD 30 MIN: CPT | Mod: S$GLB,,, | Performed by: OBSTETRICS & GYNECOLOGY

## 2023-05-01 PROCEDURE — 3078F PR MOST RECENT DIASTOLIC BLOOD PRESSURE < 80 MM HG: ICD-10-PCS | Mod: CPTII,S$GLB,, | Performed by: OBSTETRICS & GYNECOLOGY

## 2023-05-01 PROCEDURE — 1160F RVW MEDS BY RX/DR IN RCRD: CPT | Mod: CPTII,S$GLB,, | Performed by: OBSTETRICS & GYNECOLOGY

## 2023-05-01 RX ORDER — B-COMPLEX WITH VITAMIN C
1 TABLET ORAL DAILY
Qty: 30 TABLET | Refills: 10 | Status: SHIPPED | OUTPATIENT
Start: 2023-05-01 | End: 2023-07-27 | Stop reason: SDUPTHER

## 2023-05-01 NOTE — PROGRESS NOTES
CC: Patient may be pregnant    Shawandakrissy Dunn is a 32 y.o. female  presents for confirmation of pregnancy.  She denies nausea/vomiting/abdominal pain/bleeding.  UPT is positive.  Went to [facility] Emergency room 2 days ago due to some vaginal spotting in early pregnancy at 7 weeks' gestation.  She had an ultrasound there that showed fetal cardiac activity and no evidence of other abnormalities.  She was found to have urinary tract infection and was given an antibiotic for that and also had dehydration and she was given hydration with IV fluid at that visit in the emergency room.  She states that she now feels fine and no more evidence of vaginal bleeding.  She did have a recent ultrasound approximately 3 months ago that showed a small uterine fibroid.  Otherwise she has no other prenatal risk factors and has had 4 prior normal vaginal deliveries.  She had mild postpartum preeclampsia after delivery of her 2nd pregnancy and none since then with her subsequent pregnancies.  She requests bilateral tubal ligation after delivery of this pregnancy.    Past Medical History:   Diagnosis Date    Abnormal Pap smear of cervix      Past Surgical History:   Procedure Laterality Date    CERVICAL BIOPSY  W/ LOOP ELECTRODE EXCISION       Social History     Socioeconomic History    Marital status: Single   Tobacco Use    Smoking status: Former     Types: Cigarettes     Passive exposure: Never    Smokeless tobacco: Never   Substance and Sexual Activity    Alcohol use: Not Currently    Drug use: Never    Sexual activity: Yes     Partners: Male     Birth control/protection: None     Family History   Problem Relation Age of Onset    Diabetes Paternal Grandfather     Diabetes Paternal Grandmother      OB History    Para Term  AB Living   5 4 4     4   SAB IAB Ectopic Multiple Live Births           4      # Outcome Date GA Lbr Fernandez/2nd Weight Sex Delivery Anes PTL Lv   5 Current            4 Term           "  3 Term            2 Term            1 Term                /74   Pulse 96   Ht 5' 9" (1.753 m)   Wt 129.7 kg (286 lb)   LMP 03/12/2023 (Exact Date)   BMI 42.23 kg/m²     ROS:  GENERAL: Denies weight gain or weight loss. Feeling well overall.   SKIN: Denies rash or lesions.   HEAD: Denies head injury or headache.   NODES: Denies enlarged lymph nodes.   CHEST: Denies chest pain or shortness of breath.   CARDIOVASCULAR: Denies palpitations or left sided chest pain.   ABDOMEN: No abdominal pain, constipation, diarrhea, nausea, vomiting or rectal bleeding.   URINARY: No frequency, dysuria, hematuria, or burning on urination.  REPRODUCTIVE: See HPI.   BREASTS: The patient performs breast self-examination and denies pain, lumps, or nipple discharge.   HEMATOLOGIC: No easy bruisability or excessive bleeding.   MUSCULOSKELETAL: Denies joint pain or swelling.   NEUROLOGIC: Denies syncope or weakness.   PSYCHIATRIC: Denies depression, anxiety or mood swings.    PE:   APPEARANCE: Well nourished, well developed, in no acute distress.  AFFECT: WNL, alert and oriented x 3.  SKIN: No acne or hirsutism.  NECK: Neck symmetric without masses or thyromegaly.  NODES: No cervical or axillary lymph node enlargement.  CHEST: Good respiratory effort.   ABDOMEN: Soft. No tenderness or masses. No hernias.  EXTREMITIES: No edema.          ASSESSMENT and PLAN:    ICD-10-CM ICD-9-CM    1. Possible pregnancy  Z32.00 V72.40 POCT Urine Pregnancy      ABO/Rh      CBC Without Differential      Rubella Antibody, IgG      RPR      HIV 1/2 Ag/Ab (4th Gen)      Urine culture      Maternal Integrated Screen Part 1      C. trachomatis/N. gonorrhoeae by AMP DNA [facility]; Urine      Hepatitis Panel, Acute      Toxicology Screen, Urine      Maternal Integrated Screen Part 1      2. Vaginal bleeding affecting early pregnancy  O20.9 640.90 US OB/GYN Procedure ([facility])-Future      3. Uterine fibroids affecting pregnancy in first trimester  O34.11 " 654.13 US OB/GYN Procedure ([facility])-Future    D25.9 218.9       4. Threatened miscarriage in early pregnancy  O20.0 640.03 US OB/GYN Procedure ([facility])-Future      5. Pregnancy with 7 completed weeks gestation  Z3A.01 V22.2         Possible pregnancy  -     POCT Urine Pregnancy  -     ABO/Rh; Future; Expected date: 05/01/2023  -     CBC Without Differential; Future; Expected date: 05/01/2023  -     Rubella Antibody, IgG; Future; Expected date: 05/01/2023  -     RPR; Future; Expected date: 05/01/2023  -     HIV 1/2 Ag/Ab (4th Gen); Future; Expected date: 05/01/2023  -     Urine culture  -     Maternal Integrated Screen Part 1; Future; Expected date: 05/01/2023  -     C. trachomatis/N. gonorrhoeae by AMP DNA [facility]; Urine  -     Hepatitis Panel, Acute; Future; Expected date: 05/01/2023  -     Toxicology Screen, Urine  -     Maternal Integrated Screen Part 1; Future; Expected date: 05/01/2023    Vaginal bleeding affecting early pregnancy  -     US OB/GYN Procedure ([facility])-Future; Future; Expected date: 05/08/2023    Uterine fibroids affecting pregnancy in first trimester  -     US OB/GYN Procedure ([facility])-Future; Future; Expected date: 05/08/2023    Threatened miscarriage in early pregnancy  -     US OB/GYN Procedure ([facility])-Future; Future; Expected date: 05/08/2023    Pregnancy with 7 completed weeks gestation          Patient was counseled today on pregnancy.  Great expectations and safe med list reviewed and given to patient.  Discussed prenatal vitamin options (i.e. stool softener, DHA).    Follow up in about 4 weeks (around 5/29/2023) for For routine OB visit and prenatal labs in 3-4 weeks.  We will scheduleund ultrasound 1 week.

## 2023-05-02 LAB
AMPHET+METHAMPHET UR QL: NEGATIVE
BARBITURATES UR QL SCN>200 NG/ML: NEGATIVE
BENZODIAZ UR QL SCN>200 NG/ML: NEGATIVE
BZE UR QL SCN: NEGATIVE
C TRACH DNA SPEC QL NAA+PROBE: NOT DETECTED
CANNABINOIDS UR QL SCN: NEGATIVE
CREAT UR-MCNC: 101 MG/DL (ref 15–325)
ETHANOL UR-MCNC: <10 MG/DL
METHADONE UR QL SCN>300 NG/ML: NEGATIVE
N GONORRHOEA DNA SPEC QL NAA+PROBE: NOT DETECTED
OPIATES UR QL SCN: NEGATIVE
PCP UR QL SCN>25 NG/ML: NEGATIVE
TOXICOLOGY INFORMATION: NORMAL

## 2023-05-03 LAB
BACTERIA UR CULT: NORMAL
BACTERIA UR CULT: NORMAL

## 2023-05-05 ENCOUNTER — PROCEDURE VISIT (OUTPATIENT)
Dept: MATERNAL FETAL MEDICINE | Facility: CLINIC | Age: 33
End: 2023-05-05
Payer: MEDICAID

## 2023-05-05 DIAGNOSIS — O20.0 THREATENED MISCARRIAGE IN EARLY PREGNANCY: ICD-10-CM

## 2023-05-05 DIAGNOSIS — O20.9 VAGINAL BLEEDING AFFECTING EARLY PREGNANCY: ICD-10-CM

## 2023-05-05 DIAGNOSIS — O34.11 UTERINE FIBROIDS AFFECTING PREGNANCY IN FIRST TRIMESTER: ICD-10-CM

## 2023-05-05 DIAGNOSIS — D25.9 UTERINE FIBROIDS AFFECTING PREGNANCY IN FIRST TRIMESTER: ICD-10-CM

## 2023-05-05 PROCEDURE — 76801 OB US < 14 WKS SINGLE FETUS: CPT | Mod: S$GLB,,, | Performed by: OBSTETRICS & GYNECOLOGY

## 2023-05-05 PROCEDURE — 76801 US OB/GYN PROCEDURE (VIEWPOINT): ICD-10-PCS | Mod: S$GLB,,, | Performed by: OBSTETRICS & GYNECOLOGY

## 2023-05-08 DIAGNOSIS — Z36.89 ENCOUNTER FOR FETAL ANATOMIC SURVEY: Primary | ICD-10-CM

## 2023-05-17 ENCOUNTER — ROUTINE PRENATAL (OUTPATIENT)
Dept: OBSTETRICS AND GYNECOLOGY | Facility: CLINIC | Age: 33
End: 2023-05-17
Payer: MEDICAID

## 2023-05-17 VITALS — DIASTOLIC BLOOD PRESSURE: 87 MMHG | BODY MASS INDEX: 41.64 KG/M2 | WEIGHT: 282 LBS | SYSTOLIC BLOOD PRESSURE: 125 MMHG

## 2023-05-17 DIAGNOSIS — Z3A.09 PREGNANCY WITH 9 COMPLETED WEEKS GESTATION: Primary | ICD-10-CM

## 2023-05-17 PROCEDURE — 99212 PR OFFICE/OUTPT VISIT, EST, LEVL II, 10-19 MIN: ICD-10-PCS | Mod: TH,S$GLB,, | Performed by: OBSTETRICS & GYNECOLOGY

## 2023-05-17 PROCEDURE — 99212 OFFICE O/P EST SF 10 MIN: CPT | Mod: TH,S$GLB,, | Performed by: OBSTETRICS & GYNECOLOGY

## 2023-05-17 NOTE — PROGRESS NOTES
Doing well at 9 weeks' gestation.  No complaints.  Still having spotting but less spotting than last week when her ultrasound was done and it showed normal intrauterine pregnancy at expected gestational age.  Not far enough along in the pregnancy yet to get pregnancy labs.  She will return in 3 weeks for labs and to listen for heart tones.

## 2023-05-24 ENCOUNTER — PATIENT MESSAGE (OUTPATIENT)
Dept: OBSTETRICS AND GYNECOLOGY | Facility: CLINIC | Age: 33
End: 2023-05-24
Payer: MEDICAID

## 2023-06-07 ENCOUNTER — ROUTINE PRENATAL (OUTPATIENT)
Dept: OBSTETRICS AND GYNECOLOGY | Facility: CLINIC | Age: 33
End: 2023-06-07
Payer: MEDICAID

## 2023-06-07 ENCOUNTER — LAB VISIT (OUTPATIENT)
Dept: LAB | Facility: CLINIC | Age: 33
End: 2023-06-07
Payer: MEDICAID

## 2023-06-07 VITALS — BODY MASS INDEX: 41.64 KG/M2 | SYSTOLIC BLOOD PRESSURE: 136 MMHG | DIASTOLIC BLOOD PRESSURE: 82 MMHG | WEIGHT: 282 LBS

## 2023-06-07 DIAGNOSIS — Z3A.12 12 WEEKS GESTATION OF PREGNANCY: Primary | ICD-10-CM

## 2023-06-07 DIAGNOSIS — Z3A.12 12 WEEKS GESTATION OF PREGNANCY: ICD-10-CM

## 2023-06-07 DIAGNOSIS — Z32.00 POSSIBLE PREGNANCY: ICD-10-CM

## 2023-06-07 LAB
ABO + RH BLD: NORMAL
BASOPHILS # BLD AUTO: 0.02 K/UL (ref 0–0.2)
BASOPHILS NFR BLD: 0.2 % (ref 0–1.9)
BLD GP AB SCN CELLS X3 SERPL QL: NORMAL
DIFFERENTIAL METHOD: ABNORMAL
EOSINOPHIL # BLD AUTO: 0.1 K/UL (ref 0–0.5)
EOSINOPHIL NFR BLD: 1 % (ref 0–8)
ERYTHROCYTE [DISTWIDTH] IN BLOOD BY AUTOMATED COUNT: 12.1 % (ref 11.5–14.5)
HCT VFR BLD AUTO: 35.6 % (ref 37–48.5)
HGB BLD-MCNC: 11.8 G/DL (ref 12–16)
IMM GRANULOCYTES # BLD AUTO: 0.08 K/UL (ref 0–0.04)
IMM GRANULOCYTES NFR BLD AUTO: 0.7 % (ref 0–0.5)
LYMPHOCYTES # BLD AUTO: 2.6 K/UL (ref 1–4.8)
LYMPHOCYTES NFR BLD: 23.1 % (ref 18–48)
MCH RBC QN AUTO: 29.2 PG (ref 27–31)
MCHC RBC AUTO-ENTMCNC: 33.1 G/DL (ref 32–36)
MCV RBC AUTO: 88 FL (ref 82–98)
MONOCYTES # BLD AUTO: 0.8 K/UL (ref 0.3–1)
MONOCYTES NFR BLD: 7 % (ref 4–15)
NEUTROPHILS # BLD AUTO: 7.5 K/UL (ref 1.8–7.7)
NEUTROPHILS NFR BLD: 68 % (ref 38–73)
NRBC BLD-RTO: 0 /100 WBC
PLATELET # BLD AUTO: 325 K/UL (ref 150–450)
PMV BLD AUTO: 9.9 FL (ref 9.2–12.9)
RBC # BLD AUTO: 4.04 M/UL (ref 4–5.4)
WBC # BLD AUTO: 11.07 K/UL (ref 3.9–12.7)

## 2023-06-07 PROCEDURE — 36415 COLL VENOUS BLD VENIPUNCTURE: CPT | Mod: ,,, | Performed by: STUDENT IN AN ORGANIZED HEALTH CARE EDUCATION/TRAINING PROGRAM

## 2023-06-07 PROCEDURE — 85025 COMPLETE CBC W/AUTO DIFF WBC: CPT | Performed by: NURSE PRACTITIONER

## 2023-06-07 PROCEDURE — 36415 PR COLLECTION VENOUS BLOOD,VENIPUNCTURE: ICD-10-PCS | Mod: ,,, | Performed by: STUDENT IN AN ORGANIZED HEALTH CARE EDUCATION/TRAINING PROGRAM

## 2023-06-07 PROCEDURE — 99213 OFFICE O/P EST LOW 20 MIN: CPT | Mod: TH,S$GLB,, | Performed by: NURSE PRACTITIONER

## 2023-06-07 PROCEDURE — 99213 PR OFFICE/OUTPT VISIT, EST, LEVL III, 20-29 MIN: ICD-10-PCS | Mod: TH,S$GLB,, | Performed by: NURSE PRACTITIONER

## 2023-06-07 PROCEDURE — 87389 HIV-1 AG W/HIV-1&-2 AB AG IA: CPT | Performed by: NURSE PRACTITIONER

## 2023-06-07 PROCEDURE — 86900 BLOOD TYPING SEROLOGIC ABO: CPT | Performed by: NURSE PRACTITIONER

## 2023-06-07 PROCEDURE — 86592 SYPHILIS TEST NON-TREP QUAL: CPT | Performed by: NURSE PRACTITIONER

## 2023-06-07 PROCEDURE — 86762 RUBELLA ANTIBODY: CPT | Performed by: NURSE PRACTITIONER

## 2023-06-07 RX ORDER — AMOXICILLIN 500 MG/1
500 CAPSULE ORAL 3 TIMES DAILY
COMMUNITY
Start: 2023-05-26 | End: 2023-10-17

## 2023-06-07 RX ORDER — FLUCONAZOLE 150 MG/1
TABLET ORAL
Qty: 2 TABLET | Refills: 1 | Status: SHIPPED | OUTPATIENT
Start: 2023-06-07 | End: 2023-07-27

## 2023-06-07 NOTE — PROGRESS NOTES
2023  32 y.o. 12w2d Estimated Date of Delivery: 23, dating reviewed.   OB History    Para Term  AB Living   5 4 4     4   SAB IAB Ectopic Multiple Live Births           4      # Outcome Date GA Lbr Fernandez/2nd Weight Sex Delivery Anes PTL Lv   5 Current            4 Term            3 Term            2 Term            1 Term              The patient presents with complaints of   Chief Complaint   Patient presents with    Routine Prenatal Visit            Reports: Fetal movement is reported as Not yet felt No Bleeding or contractions . She is doing well.  She is taking prenatal vitamins. Ms. Dunn   is adjusting well and has a good support system of family and friends. She is coping with pregnancy and having no difficulty with sleep.No complaints at this time.      Prenatal labs reviewed and updated today    Review of Systems:  General ROS: negative for headache or visual changes  Breast ROS: negative for breast lumps  Gastrointestinal ROS: negative for constipation, diarrhea or nausea/vomiting  Musculoskeletal ROS: negative for pain in joints or swelling in face or hands.   Neurological ROS: negative for - headaches, numbness/tingling or visual changes      Physical Exam:  /82   Wt 127.9 kg (282 lb)   LMP 2023 (Exact Date)   BMI 41.64 kg/m²       Constitutional: She is oriented to person, place, and time. She appears well-developed and well-nourished. No distress.     Pulmonary/Chest: Effort normal. No respiratory distress  Abdominal: Soft, gravid, nontender. No rebound and no guarding.   Genitourinary: Deferred   Musculoskeletal: Normal range of motion, Minimal peripheral edema.   Neurological: She is alert and oriented to person, place, and time. Coordination normal.   Skin: Skin is warm and dry. She is not diaphoretic.  Psychiatric: She has a normal mood and affect.        Assessment:  Overall doing well.   32 y.o., at 12w2d Gestation   Patient Active Problem List    Diagnosis    Vaginal bleeding affecting early pregnancy    Uterine fibroids affecting pregnancy in first trimester    Threatened miscarriage in early pregnancy     Current Outpatient Medications on File Prior to Visit   Medication Sig Dispense Refill    amoxicillin (AMOXIL) 500 MG capsule Take 500 mg by mouth 3 (three) times daily.      prenatal vit no.130-iron-folic (PRENATAL VITAMIN) 27 mg iron- 800 mcg Tab Take 1 tablet by mouth once daily. 30 tablet 10    [DISCONTINUED] drospirenone-ethinyl estradioL (ROSALVA) 3-0.02 mg per tablet Take 1 tablet by mouth once daily. (Patient not taking: Reported on 5/1/2023) 84 tablet 3     No current facility-administered medications on file prior to visit.       12 weeks gestation of pregnancy         Plan:  Overall doing well  Follow up 4Weeks, bleeding/pain precautions, kick counts, labor precautions discussed.

## 2023-06-08 LAB
HIV 1+2 AB+HIV1 P24 AG SERPL QL IA: NORMAL
RPR SER QL: NORMAL
RUBV IGG SER-ACNC: 22.8 IU/ML
RUBV IGG SER-IMP: REACTIVE

## 2023-06-15 ENCOUNTER — TELEPHONE (OUTPATIENT)
Dept: OBSTETRICS AND GYNECOLOGY | Facility: CLINIC | Age: 33
End: 2023-06-15
Payer: MEDICAID

## 2023-07-03 ENCOUNTER — PATIENT MESSAGE (OUTPATIENT)
Dept: OTHER | Facility: OTHER | Age: 33
End: 2023-07-03
Payer: MEDICAID

## 2023-07-05 ENCOUNTER — ROUTINE PRENATAL (OUTPATIENT)
Dept: OBSTETRICS AND GYNECOLOGY | Facility: CLINIC | Age: 33
End: 2023-07-05
Payer: MEDICAID

## 2023-07-05 VITALS
SYSTOLIC BLOOD PRESSURE: 123 MMHG | BODY MASS INDEX: 41.73 KG/M2 | DIASTOLIC BLOOD PRESSURE: 79 MMHG | HEART RATE: 89 BPM | WEIGHT: 282.63 LBS

## 2023-07-05 DIAGNOSIS — Z3A.16 16 WEEKS GESTATION OF PREGNANCY: Primary | ICD-10-CM

## 2023-07-05 PROCEDURE — 99213 PR OFFICE/OUTPT VISIT, EST, LEVL III, 20-29 MIN: ICD-10-PCS | Mod: TH,S$GLB,, | Performed by: NURSE PRACTITIONER

## 2023-07-05 PROCEDURE — 99213 OFFICE O/P EST LOW 20 MIN: CPT | Mod: TH,S$GLB,, | Performed by: NURSE PRACTITIONER

## 2023-07-05 NOTE — PROGRESS NOTES
2023  32 y.o. 16w2d Estimated Date of Delivery: 23, dating reviewed.   OB History    Para Term  AB Living   5 4 4     4   SAB IAB Ectopic Multiple Live Births           4      # Outcome Date GA Lbr Fernandez/2nd Weight Sex Delivery Anes PTL Lv   5 Current            4 Term            3 Term            2 Term            1 Term              The patient presents with complaints of   Chief Complaint   Patient presents with    Routine Prenatal Visit     DEJA 16w2d       Reports: Fetal movement is reported as Not yet felt No Bleeding or contractions . She is doing well.  She is taking prenatal vitamins. Ms. Dunn   is adjusting well and has a good support system of family and friends. She is coping with pregnancy and having no difficulty with sleep.  No complaints at this time.      Prenatal labs reviewed and updated today    Review of Systems:  General ROS: negative for headache or visual changes  Breast ROS: negative for breast lumps  Gastrointestinal ROS: negative for constipation, diarrhea or nausea/vomiting  Musculoskeletal ROS: negative for pain in joints or swelling in face or hands.   Neurological ROS: negative for - headaches, numbness/tingling or visual changes      Physical Exam:  /79   Pulse 89   Wt 128.2 kg (282 lb 9.6 oz)   LMP 2023 (Exact Date)   BMI 41.73 kg/m²       Constitutional: She is oriented to person, place, and time. She appears well-developed and well-nourished. No distress.     Pulmonary/Chest: Effort normal. No respiratory distress  Abdominal: Soft, gravid, nontender. No rebound and no guarding.   Genitourinary: Deferred   Musculoskeletal: Normal range of motion, Minimal peripheral edema.   Neurological: She is alert and oriented to person, place, and time. Coordination normal.   Skin: Skin is warm and dry. She is not diaphoretic.  Psychiatric: She has a normal mood and affect.        Assessment:  Overall doing well.   32 y.o., at 16w2d Gestation   Patient  Active Problem List   Diagnosis    Vaginal bleeding affecting early pregnancy    Uterine fibroids affecting pregnancy in first trimester    Threatened miscarriage in early pregnancy     Current Outpatient Medications on File Prior to Visit   Medication Sig Dispense Refill    prenatal vit no.130-iron-folic (PRENATAL VITAMIN) 27 mg iron- 800 mcg Tab Take 1 tablet by mouth once daily. 30 tablet 10    amoxicillin (AMOXIL) 500 MG capsule Take 500 mg by mouth 3 (three) times daily.      fluconazole (DIFLUCAN) 150 MG Tab 1 po Now may repeat in 72 hr (Patient not taking: Reported on 7/5/2023) 2 tablet 1     No current facility-administered medications on file prior to visit.       16 weeks gestation of pregnancy         Plan:  Overall doing well  Follow up 4Weeks, bleeding/pain precautions, kick counts, labor precautions discussed.

## 2023-07-10 ENCOUNTER — PATIENT MESSAGE (OUTPATIENT)
Dept: OTHER | Facility: OTHER | Age: 33
End: 2023-07-10
Payer: MEDICAID

## 2023-07-24 ENCOUNTER — PATIENT MESSAGE (OUTPATIENT)
Dept: RESEARCH | Facility: HOSPITAL | Age: 33
End: 2023-07-24
Payer: MEDICAID

## 2023-07-27 ENCOUNTER — LAB VISIT (OUTPATIENT)
Dept: LAB | Facility: CLINIC | Age: 33
End: 2023-07-27
Payer: MEDICAID

## 2023-07-27 ENCOUNTER — ROUTINE PRENATAL (OUTPATIENT)
Dept: OBSTETRICS AND GYNECOLOGY | Facility: CLINIC | Age: 33
End: 2023-07-27
Payer: MEDICAID

## 2023-07-27 ENCOUNTER — PROCEDURE VISIT (OUTPATIENT)
Dept: MATERNAL FETAL MEDICINE | Facility: CLINIC | Age: 33
End: 2023-07-27
Payer: MEDICAID

## 2023-07-27 VITALS — BODY MASS INDEX: 42.68 KG/M2 | WEIGHT: 289 LBS | DIASTOLIC BLOOD PRESSURE: 64 MMHG | SYSTOLIC BLOOD PRESSURE: 118 MMHG

## 2023-07-27 DIAGNOSIS — Z3A.01 PREGNANCY WITH 7 COMPLETED WEEKS GESTATION: ICD-10-CM

## 2023-07-27 DIAGNOSIS — Z36.89 ENCOUNTER FOR FETAL ANATOMIC SURVEY: ICD-10-CM

## 2023-07-27 DIAGNOSIS — Z3A.23 23 WEEKS GESTATION OF PREGNANCY: ICD-10-CM

## 2023-07-27 DIAGNOSIS — Z3A.19 19 WEEKS GESTATION OF PREGNANCY: ICD-10-CM

## 2023-07-27 DIAGNOSIS — Z3A.19 19 WEEKS GESTATION OF PREGNANCY: Primary | ICD-10-CM

## 2023-07-27 LAB
HAV IGM SERPL QL IA: NORMAL
HBV CORE IGM SERPL QL IA: NORMAL
HBV SURFACE AG SERPL QL IA: NORMAL
HCV AB SERPL QL IA: NORMAL

## 2023-07-27 PROCEDURE — 82105 ALPHA-FETOPROTEIN SERUM: CPT | Performed by: NURSE PRACTITIONER

## 2023-07-27 PROCEDURE — 59425 PR ANTEPARTUM CARE ONLY, 4-6 VISITS: ICD-10-PCS | Mod: TH,S$GLB,, | Performed by: NURSE PRACTITIONER

## 2023-07-27 PROCEDURE — 76811 US OB/GYN PROCEDURE (VIEWPOINT): ICD-10-PCS | Mod: S$GLB,,, | Performed by: OBSTETRICS & GYNECOLOGY

## 2023-07-27 PROCEDURE — 80074 ACUTE HEPATITIS PANEL: CPT | Performed by: NURSE PRACTITIONER

## 2023-07-27 PROCEDURE — 59425 ANTEPARTUM CARE ONLY: CPT | Mod: TH,S$GLB,, | Performed by: NURSE PRACTITIONER

## 2023-07-27 PROCEDURE — 76811 OB US DETAILED SNGL FETUS: CPT | Mod: S$GLB,,, | Performed by: OBSTETRICS & GYNECOLOGY

## 2023-07-27 PROCEDURE — 36415 COLL VENOUS BLD VENIPUNCTURE: CPT | Mod: ,,, | Performed by: STUDENT IN AN ORGANIZED HEALTH CARE EDUCATION/TRAINING PROGRAM

## 2023-07-27 PROCEDURE — 36415 PR COLLECTION VENOUS BLOOD,VENIPUNCTURE: ICD-10-PCS | Mod: ,,, | Performed by: STUDENT IN AN ORGANIZED HEALTH CARE EDUCATION/TRAINING PROGRAM

## 2023-07-27 RX ORDER — B-COMPLEX WITH VITAMIN C
1 TABLET ORAL DAILY
Qty: 30 TABLET | Refills: 10 | Status: SHIPPED | OUTPATIENT
Start: 2023-07-27 | End: 2023-10-24

## 2023-07-27 NOTE — PROGRESS NOTES
2023  32 y.o. 19w3d Estimated Date of Delivery: 23, dating reviewed.   OB History    Para Term  AB Living   5 4 4     4   SAB IAB Ectopic Multiple Live Births           4      # Outcome Date GA Lbr Fernandez/2nd Weight Sex Delivery Anes PTL Lv   5 Current            4 Term            3 Term            2 Term            1 Term              The patient presents with complaints of   Chief Complaint   Patient presents with    Routine Prenatal Visit     19 3/7       Reports: Fetal movement is reported as Not yet felt No Bleeding or contractions . She is doing well.  She is taking prenatal vitamins. Ms. Dnun   is adjusting well and has a good support system of family and friends. She is coping with pregnancy and having no difficulty with sleep.No complaints at this time.  Ultrasound today with in normal limits. Discussed and reviewed with pt.       Prenatal labs reviewed and updated today    Review of Systems:  General ROS: negative for headache or visual changes  Breast ROS: negative for breast lumps  Gastrointestinal ROS: negative for constipation, diarrhea or nausea/vomiting  Musculoskeletal ROS: negative for pain in joints or swelling in face or hands.   Neurological ROS: negative for - headaches, numbness/tingling or visual changes      Physical Exam:  /64   Wt 131.1 kg (289 lb)   LMP 2023 (Exact Date)   BMI 42.68 kg/m²       Constitutional: She is oriented to person, place, and time. She appears well-developed and well-nourished. No distress.     Pulmonary/Chest: Effort normal. No respiratory distress  Abdominal: Soft, gravid, nontender. No rebound and no guarding.   Genitourinary: Deferred   Musculoskeletal: Normal range of motion, Minimal peripheral edema.   Neurological: She is alert and oriented to person, place, and time. Coordination normal.   Skin: Skin is warm and dry. She is not diaphoretic.  Psychiatric: She has a normal mood and affect.        Assessment:  Overall  doing well.   32 y.o., at 19w3d Gestation   Patient Active Problem List   Diagnosis    Vaginal bleeding affecting early pregnancy    Uterine fibroids affecting pregnancy in first trimester    Threatened miscarriage in early pregnancy     Current Outpatient Medications on File Prior to Visit   Medication Sig Dispense Refill    [DISCONTINUED] prenatal vit no.130-iron-folic (PRENATAL VITAMIN) 27 mg iron- 800 mcg Tab Take 1 tablet by mouth once daily. 30 tablet 10    amoxicillin (AMOXIL) 500 MG capsule Take 500 mg by mouth 3 (three) times daily.      [DISCONTINUED] fluconazole (DIFLUCAN) 150 MG Tab 1 po Now may repeat in 72 hr (Patient not taking: Reported on 7/5/2023) 2 tablet 1     No current facility-administered medications on file prior to visit.       19 weeks gestation of pregnancy  -     Hepatitis Panel, Acute; Future; Expected date: 07/27/2023  -     Maternal Screen AFP (Single Marker); Future; Expected date: 07/27/2023    23 weeks gestation of pregnancy  -     US OB/GYN Procedure ([facility]); Future    Pregnancy with 7 completed weeks gestation  -     prenatal vit no.130-iron-folic (PRENATAL VITAMIN) 27 mg iron- 800 mcg Tab; Take 1 tablet by mouth once daily.  Dispense: 30 tablet; Refill: 10         Plan:  Overall doing well  Follow up 4Weeks, bleeding/pain precautions, kick counts, labor precautions discussed.

## 2023-07-31 ENCOUNTER — TELEPHONE (OUTPATIENT)
Dept: MATERNAL FETAL MEDICINE | Facility: CLINIC | Age: 33
End: 2023-07-31
Payer: MEDICAID

## 2023-07-31 ENCOUNTER — PATIENT MESSAGE (OUTPATIENT)
Dept: OTHER | Facility: OTHER | Age: 33
End: 2023-07-31
Payer: MEDICAID

## 2023-07-31 LAB
# FETUSES US: NORMAL
AFP INTERPRETATION: NORMAL
AFP MOM SERPL: 0.71
AFP SERPL-MCNC: 36.9 NG/ML
AFP SERPL-MCNC: NEGATIVE NG/ML
AGE AT DELIVERY: 33
GA (DAYS): 3 D
GA (WEEKS): 19 WK
GESTATIONAL AGE METHOD: NORMAL
IDDM PATIENT QL: NORMAL
SMOKING STATUS FTND: NO

## 2023-07-31 NOTE — TELEPHONE ENCOUNTER
Pt verified self by name   Pt states she wanted to reach out to confirm next time she comes for u/s that its not the same sonographer that did her ultrasound because she felt that her belly was pressed on very hard and her belly is still hurting. She wanted to talk to her provider if she can have a TVUS instead next time, not abdominally because she was in a lot of pain from her u/s. RN reviewed and pt to come back for finish anatomy and the ultrasound will not be able to get the views needed to complete her anatomy.    RN will annotate in her next appt note to have a different tech doing her u/s.     Pt verbalized understanding. Agreed to POC w intent to comply.    ----- Message from Cam Carlos LPN sent at 2023 12:37 PM CDT -----  Regarding: FW: US  Contact: pt [phone]    ----- Message -----  From: Ailyn Malik  Sent: 2023  12:20 PM CDT  To: [facility] Staff  Subject: US                                               Type: Needs Medical Advice  Who Called:  Pt     Best Call Back Number: [phone]    Additional Information: Pt stated she had US done . Pt stated her tech was very inconsiderate and she was in a lot of pain during us. Pt has to repeat us at the end of the month and is requesting a different tech or a vaginal us if possible. Pls call back and advise

## 2023-08-28 ENCOUNTER — PATIENT MESSAGE (OUTPATIENT)
Dept: OTHER | Facility: OTHER | Age: 33
End: 2023-08-28
Payer: MEDICAID

## 2023-08-31 ENCOUNTER — TELEPHONE (OUTPATIENT)
Dept: OBSTETRICS AND GYNECOLOGY | Facility: CLINIC | Age: 33
End: 2023-08-31
Payer: MEDICAID

## 2023-08-31 NOTE — TELEPHONE ENCOUNTER
Pt successfully scheduled and VU.       ----- Message from Libby Chaudhry sent at 8/31/2023  4:04 PM CDT -----  Contact: PT  Type:  Sooner Appointment Request    Caller is requesting a sooner appointment.  Caller declined first available appointment listed below.  Caller will not accept being placed on the waitlist and is requesting a message be sent to doctor.    Name of Caller:  PT  When is the first available appointment?  9/21/23  Symptoms:  DEJA visit  Best Call Back Number:  [phone]  Additional Information:   PT said she was told to come back  within 1 month

## 2023-09-11 ENCOUNTER — PATIENT MESSAGE (OUTPATIENT)
Dept: OTHER | Facility: OTHER | Age: 33
End: 2023-09-11
Payer: MEDICAID

## 2023-09-11 ENCOUNTER — PATIENT MESSAGE (OUTPATIENT)
Dept: OBSTETRICS AND GYNECOLOGY | Facility: CLINIC | Age: 33
End: 2023-09-11

## 2023-09-11 ENCOUNTER — PROCEDURE VISIT (OUTPATIENT)
Dept: MATERNAL FETAL MEDICINE | Facility: CLINIC | Age: 33
End: 2023-09-11
Payer: MEDICAID

## 2023-09-11 DIAGNOSIS — Z3A.23 23 WEEKS GESTATION OF PREGNANCY: ICD-10-CM

## 2023-09-11 PROCEDURE — 76816 US OB/GYN PROCEDURE (VIEWPOINT): ICD-10-PCS | Mod: S$GLB,,, | Performed by: OBSTETRICS & GYNECOLOGY

## 2023-09-11 PROCEDURE — 76816 OB US FOLLOW-UP PER FETUS: CPT | Mod: S$GLB,,, | Performed by: OBSTETRICS & GYNECOLOGY

## 2023-09-11 NOTE — TELEPHONE ENCOUNTER
Spoke to patient. She was displeased with her appointment today and was not seen by a provider. Pt scheduled to see TAMAR Boggs as requested for Routine Obstetrical visit and 1 hour glucose test. Pt NIHARIKA.

## 2023-09-19 ENCOUNTER — PATIENT MESSAGE (OUTPATIENT)
Dept: OBSTETRICS AND GYNECOLOGY | Facility: CLINIC | Age: 33
End: 2023-09-19

## 2023-09-19 ENCOUNTER — LAB VISIT (OUTPATIENT)
Dept: LAB | Facility: CLINIC | Age: 33
End: 2023-09-19
Payer: MEDICAID

## 2023-09-19 ENCOUNTER — ROUTINE PRENATAL (OUTPATIENT)
Dept: OBSTETRICS AND GYNECOLOGY | Facility: CLINIC | Age: 33
End: 2023-09-19
Payer: MEDICAID

## 2023-09-19 VITALS — DIASTOLIC BLOOD PRESSURE: 85 MMHG | WEIGHT: 287 LBS | BODY MASS INDEX: 42.38 KG/M2 | SYSTOLIC BLOOD PRESSURE: 127 MMHG

## 2023-09-19 DIAGNOSIS — Z3A.27 27 WEEKS GESTATION OF PREGNANCY: ICD-10-CM

## 2023-09-19 DIAGNOSIS — Z3A.32 32 WEEKS GESTATION OF PREGNANCY: ICD-10-CM

## 2023-09-19 DIAGNOSIS — Z3A.27 27 WEEKS GESTATION OF PREGNANCY: Primary | ICD-10-CM

## 2023-09-19 DIAGNOSIS — Z36.9 ENCOUNTER FOR FETAL ULTRASOUND: Primary | ICD-10-CM

## 2023-09-19 LAB
BASOPHILS # BLD AUTO: 0.03 K/UL (ref 0–0.2)
BASOPHILS NFR BLD: 0.2 % (ref 0–1.9)
DIFFERENTIAL METHOD: ABNORMAL
EOSINOPHIL # BLD AUTO: 0.2 K/UL (ref 0–0.5)
EOSINOPHIL NFR BLD: 1.9 % (ref 0–8)
ERYTHROCYTE [DISTWIDTH] IN BLOOD BY AUTOMATED COUNT: 12.8 % (ref 11.5–14.5)
GLUCOSE SERPL-MCNC: 140 MG/DL (ref 70–140)
HCT VFR BLD AUTO: 33.8 % (ref 37–48.5)
HGB BLD-MCNC: 11.2 G/DL (ref 12–16)
IMM GRANULOCYTES # BLD AUTO: 0.15 K/UL (ref 0–0.04)
IMM GRANULOCYTES NFR BLD AUTO: 1.2 % (ref 0–0.5)
LYMPHOCYTES # BLD AUTO: 2.4 K/UL (ref 1–4.8)
LYMPHOCYTES NFR BLD: 19.5 % (ref 18–48)
MCH RBC QN AUTO: 29.1 PG (ref 27–31)
MCHC RBC AUTO-ENTMCNC: 33.1 G/DL (ref 32–36)
MCV RBC AUTO: 88 FL (ref 82–98)
MONOCYTES # BLD AUTO: 0.8 K/UL (ref 0.3–1)
MONOCYTES NFR BLD: 6.6 % (ref 4–15)
NEUTROPHILS # BLD AUTO: 8.7 K/UL (ref 1.8–7.7)
NEUTROPHILS NFR BLD: 70.6 % (ref 38–73)
NRBC BLD-RTO: 0 /100 WBC
PLATELET # BLD AUTO: 336 K/UL (ref 150–450)
PMV BLD AUTO: 11.2 FL (ref 9.2–12.9)
RBC # BLD AUTO: 3.85 M/UL (ref 4–5.4)
WBC # BLD AUTO: 12.38 K/UL (ref 3.9–12.7)

## 2023-09-19 PROCEDURE — 85025 COMPLETE CBC W/AUTO DIFF WBC: CPT | Performed by: NURSE PRACTITIONER

## 2023-09-19 PROCEDURE — 36415 PR COLLECTION VENOUS BLOOD,VENIPUNCTURE: ICD-10-PCS | Mod: ,,, | Performed by: STUDENT IN AN ORGANIZED HEALTH CARE EDUCATION/TRAINING PROGRAM

## 2023-09-19 PROCEDURE — 59425 ANTEPARTUM CARE ONLY: CPT | Mod: TH,S$GLB,, | Performed by: NURSE PRACTITIONER

## 2023-09-19 PROCEDURE — 82950 GLUCOSE TEST: CPT | Performed by: NURSE PRACTITIONER

## 2023-09-19 PROCEDURE — 36415 COLL VENOUS BLD VENIPUNCTURE: CPT | Mod: ,,, | Performed by: STUDENT IN AN ORGANIZED HEALTH CARE EDUCATION/TRAINING PROGRAM

## 2023-09-19 PROCEDURE — 59425 PR ANTEPARTUM CARE ONLY, 4-6 VISITS: ICD-10-PCS | Mod: TH,S$GLB,, | Performed by: NURSE PRACTITIONER

## 2023-09-19 NOTE — PROGRESS NOTES
2023  33 y.o. 27w1d Estimated Date of Delivery: 23, dating reviewed.   OB History    Para Term  AB Living   5 4 4     4   SAB IAB Ectopic Multiple Live Births           4      # Outcome Date GA Lbr Fernandez/2nd Weight Sex Delivery Anes PTL Lv   5 Current            4 Term            3 Term            2 Term            1 Term              The patient presents with complaints of   Chief Complaint   Patient presents with    Routine Prenatal Visit     27        Reports: Fetal movement is reported as Present No Bleeding or contractions . She is doing well.  She is taking prenatal vitamins. Ms. Dunn   is adjusting well and has a good support system of family and friends. She is coping with pregnancy and having no difficulty with sleep. She does report feeling faint  At times also craves powder. She has not eaten it. Desires BTL. Consent signed today. Previous ultrasound reviewed and WNL  Prenatal labs reviewed and updated today    Review of Systems:  General ROS: negative for headache or visual changes  Breast ROS: negative for breast lumps  Gastrointestinal ROS: negative for constipation, diarrhea or nausea/vomiting  Musculoskeletal ROS: negative for pain in joints or swelling in face or hands.   Neurological ROS: negative for - headaches, numbness/tingling or visual changes      Physical Exam:  /85   Wt 130.2 kg (287 lb)   LMP 2023 (Exact Date)   BMI 42.38 kg/m²       Constitutional: She is oriented to person, place, and time. She appears well-developed and well-nourished. No distress.     Pulmonary/Chest: Effort normal. No respiratory distress  Abdominal: Soft, gravid, nontender. No rebound and no guarding.   Genitourinary: Deferred   Musculoskeletal: Normal range of motion, Minimal peripheral edema.   Neurological: She is alert and oriented to person, place, and time. Coordination normal.   Skin: Skin is warm and dry. She is not diaphoretic.  Psychiatric: She has a normal mood  and affect.        Assessment:  Overall doing well.   33 y.o., at 27w1d Gestation   Patient Active Problem List   Diagnosis    Vaginal bleeding affecting early pregnancy    Uterine fibroids affecting pregnancy in first trimester    Threatened miscarriage in early pregnancy     Current Outpatient Medications on File Prior to Visit   Medication Sig Dispense Refill    PRENATAL 28 mg iron- 800 mcg Tab Take 1 tablet by mouth.      amoxicillin (AMOXIL) 500 MG capsule Take 500 mg by mouth 3 (three) times daily.      prenatal vit no.130-iron-folic (PRENATAL VITAMIN) 27 mg iron- 800 mcg Tab Take 1 tablet by mouth once daily. (Patient not taking: Reported on 9/19/2023) 30 tablet 10     No current facility-administered medications on file prior to visit.       27 weeks gestation of pregnancy  -     OB Glucose Screen; Future; Expected date: 09/19/2023  -     CBC Auto Differential; Future    32 weeks gestation of pregnancy  -     US OB/GYN Procedure ([facility]); Future         Plan:  Overall doing well  Follow up 3Weeks, bleeding/pain precautions, kick counts, labor precautions discussed.   32 week ultrasound  1hr GTT in progress.

## 2023-09-25 ENCOUNTER — PATIENT MESSAGE (OUTPATIENT)
Dept: OTHER | Facility: OTHER | Age: 33
End: 2023-09-25
Payer: MEDICAID

## 2023-10-02 ENCOUNTER — PATIENT MESSAGE (OUTPATIENT)
Dept: OBSTETRICS AND GYNECOLOGY | Facility: CLINIC | Age: 33
End: 2023-10-02
Payer: MEDICAID

## 2023-10-09 ENCOUNTER — PATIENT MESSAGE (OUTPATIENT)
Dept: OTHER | Facility: OTHER | Age: 33
End: 2023-10-09
Payer: MEDICAID

## 2023-10-13 ENCOUNTER — PATIENT MESSAGE (OUTPATIENT)
Dept: OBSTETRICS AND GYNECOLOGY | Facility: CLINIC | Age: 33
End: 2023-10-13
Payer: MEDICAID

## 2023-10-17 ENCOUNTER — ROUTINE PRENATAL (OUTPATIENT)
Dept: OBSTETRICS AND GYNECOLOGY | Facility: CLINIC | Age: 33
End: 2023-10-17
Payer: MEDICAID

## 2023-10-17 VITALS — DIASTOLIC BLOOD PRESSURE: 96 MMHG | SYSTOLIC BLOOD PRESSURE: 146 MMHG | BODY MASS INDEX: 42.23 KG/M2 | WEIGHT: 286 LBS

## 2023-10-17 DIAGNOSIS — N76.0 ACUTE VAGINITIS: ICD-10-CM

## 2023-10-17 DIAGNOSIS — Z3A.31 31 WEEKS GESTATION OF PREGNANCY: Primary | ICD-10-CM

## 2023-10-17 DIAGNOSIS — R03.0 ELEVATED BP WITHOUT DIAGNOSIS OF HYPERTENSION: ICD-10-CM

## 2023-10-17 PROCEDURE — 87591 N.GONORRHOEAE DNA AMP PROB: CPT | Performed by: NURSE PRACTITIONER

## 2023-10-17 PROCEDURE — 87491 CHLMYD TRACH DNA AMP PROBE: CPT | Performed by: NURSE PRACTITIONER

## 2023-10-17 PROCEDURE — 59426 PR ANTEPARTUM CARE ONLY, >7 VISITS: ICD-10-PCS | Mod: TH,S$GLB,, | Performed by: NURSE PRACTITIONER

## 2023-10-17 PROCEDURE — 59426 ANTEPARTUM CARE ONLY: CPT | Mod: TH,S$GLB,, | Performed by: NURSE PRACTITIONER

## 2023-10-17 RX ORDER — FLUCONAZOLE 150 MG/1
TABLET ORAL
Qty: 2 TABLET | Refills: 1 | Status: SHIPPED | OUTPATIENT
Start: 2023-10-17 | End: 2023-10-24

## 2023-10-17 NOTE — PROGRESS NOTES
10/17/2023  33 y.o. 31w1d Estimated Date of Delivery: 23, dating reviewed.   OB History    Para Term  AB Living   5 4 4     4   SAB IAB Ectopic Multiple Live Births           4      # Outcome Date GA Lbr Fernandez/2nd Weight Sex Delivery Anes PTL Lv   5 Current            4 Term            3 Term            2 Term            1 Term              The patient presents with complaints of   Chief Complaint   Patient presents with    Routine Prenatal Visit     31     Vaginal Discharge    Vaginal Pain       Reports: Fetal movement is reported as Present No Bleeding or contractions . She is doing well.  She is taking prenatal vitamins. Ms. Dunn   is adjusting well and has a good support system of family and friends. She is coping with pregnancy and having no difficulty with sleep. No complaints at this time. BP precautions discussed.      Prenatal labs reviewed and updated today    Review of Systems:  General ROS: negative for headache or visual changes  Breast ROS: negative for breast lumps  Gastrointestinal ROS: negative for constipation, diarrhea or nausea/vomiting  Musculoskeletal ROS: negative for pain in joints or swelling in face or hands.   Neurological ROS: negative for - headaches, numbness/tingling or visual changes      Physical Exam:  BP (!) 146/96   Wt 129.7 kg (286 lb)   LMP 2023 (Exact Date)   BMI 42.23 kg/m²       Constitutional: She is oriented to person, place, and time. She appears well-developed and well-nourished. No distress.     Pulmonary/Chest: Effort normal. No respiratory distress  Abdominal: Soft, gravid, nontender. No rebound and no guarding.   Genitourinary: Deferred   Musculoskeletal: Normal range of motion, Minimal peripheral edema.   Neurological: She is alert and oriented to person, place, and time. Coordination normal.   Skin: Skin is warm and dry. She is not diaphoretic.  Psychiatric: She has a normal mood and affect.        Assessment:  Overall doing well.    33 y.o., at 31w1d Gestation   Patient Active Problem List   Diagnosis    Vaginal bleeding affecting early pregnancy    Uterine fibroids affecting pregnancy in first trimester    Threatened miscarriage in early pregnancy     Current Outpatient Medications on File Prior to Visit   Medication Sig Dispense Refill    PRENATAL 28 mg iron- 800 mcg Tab Take 1 tablet by mouth.      prenatal vit no.130-iron-folic (PRENATAL VITAMIN) 27 mg iron- 800 mcg Tab Take 1 tablet by mouth once daily. (Patient not taking: Reported on 9/19/2023) 30 tablet 10    [DISCONTINUED] amoxicillin (AMOXIL) 500 MG capsule Take 500 mg by mouth 3 (three) times daily.       No current facility-administered medications on file prior to visit.       31 weeks gestation of pregnancy         Plan:  Overall doing well  Follow up 2Weeks, bleeding/pain precautions, kick counts, labor precautions discussed.  Return this week for BP recheck.

## 2023-10-18 LAB
C TRACH DNA SPEC QL NAA+PROBE: NOT DETECTED
N GONORRHOEA DNA SPEC QL NAA+PROBE: NOT DETECTED

## 2023-10-19 ENCOUNTER — TELEPHONE (OUTPATIENT)
Dept: OBSTETRICS AND GYNECOLOGY | Facility: CLINIC | Age: 33
End: 2023-10-19

## 2023-10-23 ENCOUNTER — PATIENT MESSAGE (OUTPATIENT)
Dept: OTHER | Facility: OTHER | Age: 33
End: 2023-10-23
Payer: MEDICAID

## 2023-10-24 ENCOUNTER — PROCEDURE VISIT (OUTPATIENT)
Dept: MATERNAL FETAL MEDICINE | Facility: CLINIC | Age: 33
End: 2023-10-24
Payer: MEDICAID

## 2023-10-24 ENCOUNTER — ROUTINE PRENATAL (OUTPATIENT)
Dept: OBSTETRICS AND GYNECOLOGY | Facility: CLINIC | Age: 33
End: 2023-10-24
Payer: MEDICAID

## 2023-10-24 VITALS — BODY MASS INDEX: 42.53 KG/M2 | DIASTOLIC BLOOD PRESSURE: 70 MMHG | SYSTOLIC BLOOD PRESSURE: 140 MMHG | WEIGHT: 288 LBS

## 2023-10-24 DIAGNOSIS — Z3A.32 32 WEEKS GESTATION OF PREGNANCY: ICD-10-CM

## 2023-10-24 DIAGNOSIS — Z3A.32 32 WEEKS GESTATION OF PREGNANCY: Primary | ICD-10-CM

## 2023-10-24 PROCEDURE — 76816 US OB/GYN PROCEDURE (VIEWPOINT): ICD-10-PCS | Mod: S$GLB,,, | Performed by: OBSTETRICS & GYNECOLOGY

## 2023-10-24 PROCEDURE — 59426 ANTEPARTUM CARE ONLY: CPT | Mod: TH,S$GLB,, | Performed by: NURSE PRACTITIONER

## 2023-10-24 PROCEDURE — 59426 PR ANTEPARTUM CARE ONLY, >7 VISITS: ICD-10-PCS | Mod: TH,S$GLB,, | Performed by: NURSE PRACTITIONER

## 2023-10-24 PROCEDURE — 76816 OB US FOLLOW-UP PER FETUS: CPT | Mod: S$GLB,,, | Performed by: OBSTETRICS & GYNECOLOGY

## 2023-10-24 NOTE — PROGRESS NOTES
10/24/2023  33 y.o. 32w1d Estimated Date of Delivery: 23, dating reviewed.   OB History    Para Term  AB Living   5 4 4     4   SAB IAB Ectopic Multiple Live Births           4      # Outcome Date GA Lbr Fernandez/2nd Weight Sex Delivery Anes PTL Lv   5 Current            4 Term            3 Term            2 Term            1 Term              The patient presents with complaints of   Chief Complaint   Patient presents with    Routine Prenatal Visit     32        Reports: Fetal movement is reported as Present No Bleeding or contractions . She is doing well.  She is taking prenatal vitamins. Ms. Dunn   is adjusting well and has a good support system of family and friends. She is coping with pregnancy and having no difficulty with sleep.monitoring her blood pressure at home and states that her numbers have all been less than 140/90 she states it is only when she comes here that they are mildly elevated.  Denies any headaches, vision changes, right upper quadrant pain.  Discussed the possibility of starting blood pressure medication and patient verbalizes understanding.  Will continue to assess and may start medication next week if another mild range blood pressure occurs. Ultrasound today with in normal limits. Discussed and reviewed with pt.  Possible LGA      Prenatal labs reviewed and updated today    Review of Systems:  General ROS: negative for headache or visual changes  Breast ROS: negative for breast lumps  Gastrointestinal ROS: negative for constipation, diarrhea or nausea/vomiting  Musculoskeletal ROS: negative for pain in joints or swelling in face or hands.   Neurological ROS: negative for - headaches, numbness/tingling or visual changes      Physical Exam:  BP (!) 140/70   Wt 130.6 kg (288 lb)   LMP 2023 (Exact Date)   BMI 42.53 kg/m²       Constitutional: She is oriented to person, place, and time. She appears well-developed and well-nourished. No distress.      Pulmonary/Chest: Effort normal. No respiratory distress  Abdominal: Soft, gravid, nontender. No rebound and no guarding.   Genitourinary: Deferred   Musculoskeletal: Normal range of motion, Minimal peripheral edema.   Neurological: She is alert and oriented to person, place, and time. Coordination normal.   Skin: Skin is warm and dry. She is not diaphoretic.  Psychiatric: She has a normal mood and affect.        Assessment:  Overall doing well.   33 y.o., at 32w1d Gestation   Patient Active Problem List   Diagnosis    Vaginal bleeding affecting early pregnancy    Uterine fibroids affecting pregnancy in first trimester    Threatened miscarriage in early pregnancy     Current Outpatient Medications on File Prior to Visit   Medication Sig Dispense Refill    PRENATAL 28 mg iron- 800 mcg Tab Take 1 tablet by mouth.      [DISCONTINUED] fluconazole (DIFLUCAN) 150 MG Tab 1 po Now may repeat in 72 hr (Patient not taking: Reported on 10/24/2023) 2 tablet 1    [DISCONTINUED] prenatal vit no.130-iron-folic (PRENATAL VITAMIN) 27 mg iron- 800 mcg Tab Take 1 tablet by mouth once daily. (Patient not taking: Reported on 9/19/2023) 30 tablet 10     No current facility-administered medications on file prior to visit.       32 weeks gestation of pregnancy         Plan:  Overall doing well  Follow up 1Weeks, bleeding/pain precautions, kick counts, labor precautions discussed.   Caution BP

## 2023-10-31 ENCOUNTER — PROCEDURE VISIT (OUTPATIENT)
Dept: MATERNAL FETAL MEDICINE | Facility: CLINIC | Age: 33
End: 2023-10-31
Payer: MEDICAID

## 2023-10-31 DIAGNOSIS — Z36.9 ENCOUNTER FOR FETAL ULTRASOUND: ICD-10-CM

## 2023-10-31 PROCEDURE — 76819 US OB/GYN PROCEDURE (VIEWPOINT): ICD-10-PCS | Mod: S$GLB,,, | Performed by: OBSTETRICS & GYNECOLOGY

## 2023-10-31 PROCEDURE — 76819 FETAL BIOPHYS PROFIL W/O NST: CPT | Mod: S$GLB,,, | Performed by: OBSTETRICS & GYNECOLOGY

## 2023-11-02 ENCOUNTER — ROUTINE PRENATAL (OUTPATIENT)
Dept: OBSTETRICS AND GYNECOLOGY | Facility: CLINIC | Age: 33
End: 2023-11-02
Payer: MEDICAID

## 2023-11-02 VITALS — SYSTOLIC BLOOD PRESSURE: 135 MMHG | DIASTOLIC BLOOD PRESSURE: 76 MMHG | BODY MASS INDEX: 42.83 KG/M2 | WEIGHT: 290 LBS

## 2023-11-02 DIAGNOSIS — Z3A.33 33 WEEKS GESTATION OF PREGNANCY: Primary | ICD-10-CM

## 2023-11-02 PROCEDURE — 59426 ANTEPARTUM CARE ONLY: CPT | Mod: TH,S$GLB,, | Performed by: NURSE PRACTITIONER

## 2023-11-02 PROCEDURE — 59426 PR ANTEPARTUM CARE ONLY, >7 VISITS: ICD-10-PCS | Mod: TH,S$GLB,, | Performed by: NURSE PRACTITIONER

## 2023-11-02 NOTE — PROGRESS NOTES
2023  33 y.o. 33w3d Estimated Date of Delivery: 23, dating reviewed.   OB History    Para Term  AB Living   5 4 4     4   SAB IAB Ectopic Multiple Live Births           4      # Outcome Date GA Lbr Fernandez/2nd Weight Sex Delivery Anes PTL Lv   5 Current            4 Term            3 Term            2 Term            1 Term              The patient presents with complaints of   Chief Complaint   Patient presents with    Routine Prenatal Visit     33 3/7       Reports: Fetal movement is reported as Present No Bleeding or contractions . She is doing well.  She is taking prenatal vitamins. Ms. Dunn   is adjusting well and has a good support system of family and friends. She is coping with pregnancy and having no difficulty with sleep. No complaints at this time.      Prenatal labs reviewed and updated today    Review of Systems:  General ROS: negative for headache or visual changes  Breast ROS: negative for breast lumps  Gastrointestinal ROS: negative for constipation, diarrhea or nausea/vomiting  Musculoskeletal ROS: negative for pain in joints or swelling in face or hands.   Neurological ROS: negative for - headaches, numbness/tingling or visual changes      Physical Exam:  /76   Wt 131.5 kg (290 lb)   LMP 2023 (Exact Date)   BMI 42.83 kg/m²       Constitutional: She is oriented to person, place, and time. She appears well-developed and well-nourished. No distress.     Pulmonary/Chest: Effort normal. No respiratory distress  Abdominal: Soft, gravid, nontender. No rebound and no guarding.   Genitourinary: Deferred   Musculoskeletal: Normal range of motion, Minimal peripheral edema.   Neurological: She is alert and oriented to person, place, and time. Coordination normal.   Skin: Skin is warm and dry. She is not diaphoretic.  Psychiatric: She has a normal mood and affect.        Assessment:  Overall doing well.   33 y.o., at 33w3d Gestation   Patient Active Problem List    Diagnosis    Vaginal bleeding affecting early pregnancy    Uterine fibroids affecting pregnancy in first trimester    Threatened miscarriage in early pregnancy     Current Outpatient Medications on File Prior to Visit   Medication Sig Dispense Refill    PRENATAL 28 mg iron- 800 mcg Tab Take 1 tablet by mouth.       No current facility-administered medications on file prior to visit.       33 weeks gestation of pregnancy         Plan:  Overall doing well  Follow up 1Weeks, bleeding/pain precautions, kick counts, labor precautions discussed.   Cont BPP.  Cont to caution BP

## 2023-11-07 ENCOUNTER — PROCEDURE VISIT (OUTPATIENT)
Dept: MATERNAL FETAL MEDICINE | Facility: CLINIC | Age: 33
End: 2023-11-07
Payer: MEDICAID

## 2023-11-07 ENCOUNTER — ROUTINE PRENATAL (OUTPATIENT)
Dept: OBSTETRICS AND GYNECOLOGY | Facility: CLINIC | Age: 33
End: 2023-11-07
Payer: MEDICAID

## 2023-11-07 VITALS — DIASTOLIC BLOOD PRESSURE: 74 MMHG | BODY MASS INDEX: 42.53 KG/M2 | SYSTOLIC BLOOD PRESSURE: 130 MMHG | WEIGHT: 288 LBS

## 2023-11-07 DIAGNOSIS — Z36.9 ENCOUNTER FOR FETAL ULTRASOUND: ICD-10-CM

## 2023-11-07 DIAGNOSIS — Z3A.34 34 WEEKS GESTATION OF PREGNANCY: Primary | ICD-10-CM

## 2023-11-07 PROCEDURE — 59426 PR ANTEPARTUM CARE ONLY, >7 VISITS: ICD-10-PCS | Mod: TH,S$GLB,, | Performed by: NURSE PRACTITIONER

## 2023-11-07 PROCEDURE — 76819 US OB/GYN PROCEDURE (VIEWPOINT): ICD-10-PCS | Mod: S$GLB,,, | Performed by: OBSTETRICS & GYNECOLOGY

## 2023-11-07 PROCEDURE — 76819 FETAL BIOPHYS PROFIL W/O NST: CPT | Mod: S$GLB,,, | Performed by: OBSTETRICS & GYNECOLOGY

## 2023-11-07 PROCEDURE — 59426 ANTEPARTUM CARE ONLY: CPT | Mod: TH,S$GLB,, | Performed by: NURSE PRACTITIONER

## 2023-11-07 NOTE — PROGRESS NOTES
2023  33 y.o. 34w1d Estimated Date of Delivery: 23, dating reviewed.   OB History    Para Term  AB Living   5 4 4     4   SAB IAB Ectopic Multiple Live Births           4      # Outcome Date GA Lbr Fernandez/2nd Weight Sex Delivery Anes PTL Lv   5 Current            4 Term            3 Term            2 Term            1 Term              The patient presents with complaints of   Chief Complaint   Patient presents with    Routine Prenatal Visit     34        Reports: Fetal movement is reported as Present No Bleeding or contractions . She is doing well.  She is taking prenatal vitamins. Ms. Dunn   is adjusting well and has a good support system of family and friends. She is coping with pregnancy and having no difficulty with sleep.    Prenatal labs reviewed and updated today    Review of Systems:  General ROS: negative for headache or visual changes  Breast ROS: negative for breast lumps  Gastrointestinal ROS: negative for constipation, diarrhea or nausea/vomiting  Musculoskeletal ROS: negative for pain in joints or swelling in face or hands.   Neurological ROS: negative for - headaches, numbness/tingling or visual changes      Physical Exam:  /74   Wt 130.6 kg (288 lb)   LMP 2023 (Exact Date)   BMI 42.53 kg/m²       Constitutional: She is oriented to person, place, and time. She appears well-developed and well-nourished. No distress.     Pulmonary/Chest: Effort normal. No respiratory distress  Abdominal: Soft, gravid, nontender. No rebound and no guarding.   Genitourinary: Deferred   Musculoskeletal: Normal range of motion, Minimal peripheral edema.   Neurological: She is alert and oriented to person, place, and time. Coordination normal.   Skin: Skin is warm and dry. She is not diaphoretic.  Psychiatric: She has a normal mood and affect.        Assessment:  Overall doing well.   33 y.o., at 34w1d Gestation   Patient Active Problem List   Diagnosis    Vaginal bleeding  affecting early pregnancy    Uterine fibroids affecting pregnancy in first trimester    Threatened miscarriage in early pregnancy     Current Outpatient Medications on File Prior to Visit   Medication Sig Dispense Refill    PRENATAL 28 mg iron- 800 mcg Tab Take 1 tablet by mouth.       No current facility-administered medications on file prior to visit.       There are no diagnoses linked to this encounter.     Plan:  Overall doing well  Follow up 2Weeks, bleeding/pain precautions, kick counts, labor precautions discussed.   BPP 8/8.   BP stable

## 2023-11-14 ENCOUNTER — PROCEDURE VISIT (OUTPATIENT)
Dept: MATERNAL FETAL MEDICINE | Facility: CLINIC | Age: 33
End: 2023-11-14
Payer: MEDICAID

## 2023-11-14 DIAGNOSIS — Z36.9 ENCOUNTER FOR FETAL ULTRASOUND: ICD-10-CM

## 2023-11-14 PROCEDURE — 76819 US OB/GYN PROCEDURE (VIEWPOINT): ICD-10-PCS | Mod: S$GLB,,, | Performed by: OBSTETRICS & GYNECOLOGY

## 2023-11-14 PROCEDURE — 76819 FETAL BIOPHYS PROFIL W/O NST: CPT | Mod: S$GLB,,, | Performed by: OBSTETRICS & GYNECOLOGY

## 2023-11-20 ENCOUNTER — ROUTINE PRENATAL (OUTPATIENT)
Dept: OBSTETRICS AND GYNECOLOGY | Facility: CLINIC | Age: 33
End: 2023-11-20
Payer: MEDICAID

## 2023-11-20 VITALS
DIASTOLIC BLOOD PRESSURE: 83 MMHG | BODY MASS INDEX: 43.27 KG/M2 | SYSTOLIC BLOOD PRESSURE: 135 MMHG | WEIGHT: 293 LBS | HEART RATE: 83 BPM

## 2023-11-20 DIAGNOSIS — Z3A.36 36 WEEKS GESTATION OF PREGNANCY: Primary | ICD-10-CM

## 2023-11-20 PROCEDURE — 59426 PR ANTEPARTUM CARE ONLY, >7 VISITS: ICD-10-PCS | Mod: TH,S$GLB,, | Performed by: OBSTETRICS & GYNECOLOGY

## 2023-11-20 PROCEDURE — 59426 ANTEPARTUM CARE ONLY: CPT | Mod: TH,S$GLB,, | Performed by: OBSTETRICS & GYNECOLOGY

## 2023-11-20 PROCEDURE — 87147 CULTURE TYPE IMMUNOLOGIC: CPT | Performed by: OBSTETRICS & GYNECOLOGY

## 2023-11-20 PROCEDURE — 87081 CULTURE SCREEN ONLY: CPT | Performed by: OBSTETRICS & GYNECOLOGY

## 2023-11-20 NOTE — PROGRESS NOTES
2023  Chief Complaint   Patient presents with    Routine Prenatal Visit     Pt is here for a 36 wk OB visit     33 y.o.  at 36w0d  Estimated Date of Delivery: 2023, Date entered prior to episode creation, dating reviewed.      Patient reports: Good fetal movements reported. No Bleeding or contractions . She is doing well.  She is taking prenatal vitamins. Ms. Dunn   is adjusting well and has a good support system of family and friends. She is coping with pregnancy and having no difficulty with sleep.    Went to the hospital for headache and vomiting.  Was told her blood pressures were mildly elevated however preeclampsia workup was negative.  Headache has now resolved.    Prenatal labs reviewed and updated today    OB History    Para Term  AB Living   5 4 4     4   SAB IAB Ectopic Multiple Live Births           4      # Outcome Date GA Lbr Fernandez/2nd Weight Sex Delivery Anes PTL Lv   5 Current            4 Term            3 Term            2 Term            1 Term                Review of Systems:  General ROS: negative for headache or visual changes  Breast ROS: negative for breast lumps  Gastrointestinal ROS: negative for constipation, diarrhea or nausea/vomiting  Musculoskeletal ROS: negative for pain in joints or swelling in face or hands.   Neurological ROS: negative for - headaches, numbness/tingling or visual changes      Physical Exam:  /83   Pulse 83   Wt 132.9 kg (293 lb)   LMP 2023 (Exact Date)   BMI 43.27 kg/m²     Constitutional: She is oriented to person, place, and time. She appears well-developed and well-nourished. No distress.     Pulmonary/Chest: Effort normal. No respiratory distress  Abdominal: Soft, gravid, nontender. No rebound and no guarding.   Genitourinary: Deferred   Musculoskeletal: Normal range of motion, Minimal peripheral edema.   Neurological: She is alert and oriented to person, place, and time. Coordination normal.   Skin: Skin is  warm and dry. She is not diaphoretic.  Psychiatric: She has a normal mood and affect.      Assessment:  Overall doing well.   33 y.o.at 36w0d   Patient Active Problem List   Diagnosis    Vaginal bleeding affecting early pregnancy    Uterine fibroids affecting pregnancy in first trimester    Threatened miscarriage in early pregnancy     Current Outpatient Medications on File Prior to Visit   Medication Sig Dispense Refill    PRENATAL 28 mg iron- 800 mcg Tab Take 1 tablet by mouth.       No current facility-administered medications on file prior to visit.     36 weeks gestation of pregnancy  -     Strep B Screen, Vaginal / Rectal       Plan:  Overall doing well  Follow up 1 Weeks, bleeding/pain precautions, kick counts, labor precautions discussed.   Preeclampsia precautions.  Follow-up ultrasound and GBS.  If blood pressures remained elevated consider induction of labor at the recommended time.

## 2023-11-21 ENCOUNTER — PATIENT MESSAGE (OUTPATIENT)
Dept: OBSTETRICS AND GYNECOLOGY | Facility: CLINIC | Age: 33
End: 2023-11-21
Payer: MEDICAID

## 2023-11-21 ENCOUNTER — PROCEDURE VISIT (OUTPATIENT)
Dept: MATERNAL FETAL MEDICINE | Facility: CLINIC | Age: 33
End: 2023-11-21
Payer: MEDICAID

## 2023-11-21 DIAGNOSIS — Z36.9 ENCOUNTER FOR FETAL ULTRASOUND: ICD-10-CM

## 2023-11-21 PROCEDURE — 76816 OB US FOLLOW-UP PER FETUS: CPT | Mod: S$GLB,,, | Performed by: OBSTETRICS & GYNECOLOGY

## 2023-11-21 PROCEDURE — 76819 FETAL BIOPHYS PROFIL W/O NST: CPT | Mod: S$GLB,,, | Performed by: OBSTETRICS & GYNECOLOGY

## 2023-11-21 PROCEDURE — 76819 US OB/GYN PROCEDURE (VIEWPOINT): ICD-10-PCS | Mod: S$GLB,,, | Performed by: OBSTETRICS & GYNECOLOGY

## 2023-11-21 PROCEDURE — 76816 US OB/GYN PROCEDURE (VIEWPOINT): ICD-10-PCS | Mod: S$GLB,,, | Performed by: OBSTETRICS & GYNECOLOGY

## 2023-11-22 LAB — BACTERIA SPEC AEROBE CULT: ABNORMAL

## 2023-11-28 ENCOUNTER — ROUTINE PRENATAL (OUTPATIENT)
Dept: OBSTETRICS AND GYNECOLOGY | Facility: CLINIC | Age: 33
End: 2023-11-28
Payer: MEDICAID

## 2023-11-28 VITALS — DIASTOLIC BLOOD PRESSURE: 84 MMHG | BODY MASS INDEX: 42.68 KG/M2 | WEIGHT: 289 LBS | SYSTOLIC BLOOD PRESSURE: 132 MMHG

## 2023-11-28 DIAGNOSIS — O36.63X0 EXCESSIVE FETAL GROWTH AFFECTING MANAGEMENT OF PREGNANCY IN THIRD TRIMESTER, SINGLE OR UNSPECIFIED FETUS: ICD-10-CM

## 2023-11-28 DIAGNOSIS — D25.9 UTERINE FIBROIDS AFFECTING PREGNANCY IN FIRST TRIMESTER: ICD-10-CM

## 2023-11-28 DIAGNOSIS — Z3A.37 37 WEEKS GESTATION OF PREGNANCY: Primary | ICD-10-CM

## 2023-11-28 DIAGNOSIS — O34.11 UTERINE FIBROIDS AFFECTING PREGNANCY IN FIRST TRIMESTER: ICD-10-CM

## 2023-11-28 PROCEDURE — 59426 PR ANTEPARTUM CARE ONLY, >7 VISITS: ICD-10-PCS | Mod: TH,S$GLB,, | Performed by: NURSE PRACTITIONER

## 2023-11-28 PROCEDURE — 59426 ANTEPARTUM CARE ONLY: CPT | Mod: TH,S$GLB,, | Performed by: NURSE PRACTITIONER

## 2023-11-28 NOTE — PROGRESS NOTES
2023  33 y.o. 37w1d Estimated Date of Delivery: 23, dating reviewed.   OB History    Para Term  AB Living   5 4 4     4   SAB IAB Ectopic Multiple Live Births           4      # Outcome Date GA Lbr Fernandez/2nd Weight Sex Delivery Anes PTL Lv   5 Current            4 Term            3 Term            2 Term            1 Term              The patient presents with complaints of   Chief Complaint   Patient presents with    Routine Prenatal Visit     37        Reports: Fetal movement is reported as Absent No Bleeding or contractions . She is doing well.  She is taking prenatal vitamins. Ms. Dunn   is adjusting well and has a good support system of family and friends. She is coping with pregnancy and having no difficulty with sleep.    Prenatal labs reviewed and updated today    Review of Systems:  General ROS: negative for headache or visual changes  Breast ROS: negative for breast lumps  Gastrointestinal ROS: negative for constipation, diarrhea or nausea/vomiting  Musculoskeletal ROS: negative for pain in joints or swelling in face or hands.   Neurological ROS: negative for - headaches, numbness/tingling or visual changes      Physical Exam:  /84   Wt 131.1 kg (289 lb)   LMP 2023 (Exact Date)   BMI 42.68 kg/m²       Constitutional: She is oriented to person, place, and time. She appears well-developed and well-nourished. No distress.     Pulmonary/Chest: Effort normal. No respiratory distress  Abdominal: Soft, gravid, nontender. No rebound and no guarding.   Genitourinary: Deferred   Musculoskeletal: Normal range of motion, Minimal peripheral edema.   Neurological: She is alert and oriented to person, place, and time. Coordination normal.   Skin: Skin is warm and dry. She is not diaphoretic.  Psychiatric: She has a normal mood and affect.        Assessment:  Overall doing well.   33 y.o., at 37w1d Gestation   Patient Active Problem List   Diagnosis    Vaginal bleeding affecting  early pregnancy    Uterine fibroids affecting pregnancy in first trimester    Threatened miscarriage in early pregnancy     Current Outpatient Medications on File Prior to Visit   Medication Sig Dispense Refill    PRENATAL 28 mg iron- 800 mcg Tab Take 1 tablet by mouth.       No current facility-administered medications on file prior to visit.       37 weeks gestation of pregnancy    Uterine fibroids affecting pregnancy in first trimester    Excessive fetal growth affecting management of pregnancy in third trimester, single or unspecified fetus       Sent to labor and delivery for EFM due to decreased to absent fetal movement potential fetal tachycardia.  Plan:  Overall doing well  Follow up 1Weeks, bleeding/pain precautions, kick counts, labor precautions discussed.

## 2023-12-05 ENCOUNTER — ROUTINE PRENATAL (OUTPATIENT)
Dept: OBSTETRICS AND GYNECOLOGY | Facility: CLINIC | Age: 33
End: 2023-12-05
Payer: MEDICAID

## 2023-12-05 ENCOUNTER — PROCEDURE VISIT (OUTPATIENT)
Dept: MATERNAL FETAL MEDICINE | Facility: CLINIC | Age: 33
End: 2023-12-05
Payer: MEDICAID

## 2023-12-05 VITALS — BODY MASS INDEX: 42.97 KG/M2 | WEIGHT: 291 LBS | DIASTOLIC BLOOD PRESSURE: 74 MMHG | SYSTOLIC BLOOD PRESSURE: 131 MMHG

## 2023-12-05 DIAGNOSIS — Z3A.38 38 WEEKS GESTATION OF PREGNANCY: Primary | ICD-10-CM

## 2023-12-05 DIAGNOSIS — Z36.9 ENCOUNTER FOR FETAL ULTRASOUND: ICD-10-CM

## 2023-12-05 PROCEDURE — 76819 US OB/GYN PROCEDURE (VIEWPOINT): ICD-10-PCS | Mod: S$GLB,,, | Performed by: OBSTETRICS & GYNECOLOGY

## 2023-12-05 PROCEDURE — 76819 FETAL BIOPHYS PROFIL W/O NST: CPT | Mod: S$GLB,,, | Performed by: OBSTETRICS & GYNECOLOGY

## 2023-12-05 PROCEDURE — 59426 PR ANTEPARTUM CARE ONLY, >7 VISITS: ICD-10-PCS | Mod: TH,S$GLB,, | Performed by: NURSE PRACTITIONER

## 2023-12-05 PROCEDURE — 59426 ANTEPARTUM CARE ONLY: CPT | Mod: TH,S$GLB,, | Performed by: NURSE PRACTITIONER

## 2023-12-05 NOTE — PROGRESS NOTES
2023  33 y.o. 38w1d Estimated Date of Delivery: 23, dating reviewed.   OB History    Para Term  AB Living   5 4 4     4   SAB IAB Ectopic Multiple Live Births           4      # Outcome Date GA Lbr Fernandez/2nd Weight Sex Delivery Anes PTL Lv   5 Current            4 Term            3 Term            2 Term            1 Term              The patient presents with complaints of   Chief Complaint   Patient presents with    Routine Prenatal Visit     38        Reports: Fetal movement is reported as Present No Bleeding or contractions . She is doing well.  She is taking prenatal vitamins. Ms. Dunn   is adjusting well and has a good support system of family and friends. She is coping with pregnancy and having no difficulty with sleep.  Fetal screening today was within normal limits    Prenatal labs reviewed and updated today    Review of Systems:  General ROS: negative for headache or visual changes  Breast ROS: negative for breast lumps  Gastrointestinal ROS: negative for constipation, diarrhea or nausea/vomiting  Musculoskeletal ROS: negative for pain in joints or swelling in face or hands.   Neurological ROS: negative for - headaches, numbness/tingling or visual changes      Physical Exam:  /74   Wt 132 kg (291 lb)   LMP 2023 (Exact Date)   BMI 42.97 kg/m²       Constitutional: She is oriented to person, place, and time. She appears well-developed and well-nourished. No distress.     Pulmonary/Chest: Effort normal. No respiratory distress  Abdominal: Soft, gravid, nontender. No rebound and no guarding.   Genitourinary: Deferred   Musculoskeletal: Normal range of motion, Minimal peripheral edema.   Neurological: She is alert and oriented to person, place, and time. Coordination normal.   Skin: Skin is warm and dry. She is not diaphoretic.  Psychiatric: She has a normal mood and affect.        Assessment:  Overall doing well.   33 y.o., at 38w1d Gestation   Patient Active Problem  List   Diagnosis    Vaginal bleeding affecting early pregnancy    Uterine fibroids affecting pregnancy in first trimester    Threatened miscarriage in early pregnancy     Current Outpatient Medications on File Prior to Visit   Medication Sig Dispense Refill    PRENATAL 28 mg iron- 800 mcg Tab Take 1 tablet by mouth.       No current facility-administered medications on file prior to visit.       38 weeks gestation of pregnancy        Does not elect for IOL  Plan:  Overall doing well  Follow up 1Weeks, bleeding/pain precautions, kick counts, labor precautions discussed.   BPP 8/8

## 2023-12-12 ENCOUNTER — OUTSIDE PLACE OF SERVICE (OUTPATIENT)
Dept: OBSTETRICS AND GYNECOLOGY | Facility: CLINIC | Age: 33
End: 2023-12-12

## 2023-12-12 ENCOUNTER — ROUTINE PRENATAL (OUTPATIENT)
Dept: OBSTETRICS AND GYNECOLOGY | Facility: CLINIC | Age: 33
End: 2023-12-12
Payer: MEDICAID

## 2023-12-12 VITALS — SYSTOLIC BLOOD PRESSURE: 146 MMHG | DIASTOLIC BLOOD PRESSURE: 82 MMHG | WEIGHT: 291 LBS | BODY MASS INDEX: 42.97 KG/M2

## 2023-12-12 DIAGNOSIS — Z3A.39 39 WEEKS GESTATION OF PREGNANCY: Primary | ICD-10-CM

## 2023-12-12 DIAGNOSIS — R03.0 ELEVATED BP WITHOUT DIAGNOSIS OF HYPERTENSION: ICD-10-CM

## 2023-12-12 DIAGNOSIS — O36.63X0 EXCESSIVE FETAL GROWTH AFFECTING MANAGEMENT OF PREGNANCY IN THIRD TRIMESTER, SINGLE OR UNSPECIFIED FETUS: ICD-10-CM

## 2023-12-12 PROCEDURE — 59426 ANTEPARTUM CARE ONLY: CPT | Mod: TH,S$GLB,, | Performed by: NURSE PRACTITIONER

## 2023-12-12 PROCEDURE — 59426 PR ANTEPARTUM CARE ONLY, >7 VISITS: ICD-10-PCS | Mod: TH,S$GLB,, | Performed by: NURSE PRACTITIONER

## 2023-12-12 NOTE — PROGRESS NOTES
2023  33 y.o. 39w1d Estimated Date of Delivery: 23, dating reviewed.   OB History    Para Term  AB Living   5 4 4     4   SAB IAB Ectopic Multiple Live Births           4      # Outcome Date GA Lbr Fernandez/2nd Weight Sex Delivery Anes PTL Lv   5 Current            4 Term            3 Term            2 Term            1 Term              The patient presents with complaints of   Chief Complaint   Patient presents with    Routine Prenatal Visit     39        Reports: Fetal movement is reported as absent No Bleeding or contractions . She is doing well.  She is taking prenatal vitamins. Ms. Dunn   is adjusting well and has a good support system of family and friends. She is coping with pregnancy and having no difficulty with sleep.  To some mild right upper quadrant discomfort and a headache that she had this morning which has now resolved.  She has 2+ proteinuria today.  In mild range blood pressures.  Discussed these findings with patient and suggest she go to labor and delivery for evaluation.  Patient verbalized understanding    Prenatal labs reviewed and updated today    Review of Systems:  General ROS: negative for headache or visual changes  Breast ROS: negative for breast lumps  Gastrointestinal ROS: negative for constipation, diarrhea or nausea/vomiting  Musculoskeletal ROS: negative for pain in joints or swelling in face or hands.   Neurological ROS: negative for - headaches, numbness/tingling or visual changes      Physical Exam:  BP (!) 146/82   Wt 132 kg (291 lb)   LMP 2023 (Exact Date)   BMI 42.97 kg/m²       Constitutional: She is oriented to person, place, and time. She appears well-developed and well-nourished. No distress.     Pulmonary/Chest: Effort normal. No respiratory distress  Abdominal: Soft, gravid, nontender. No rebound and no guarding.   Genitourinary: Deferred   Musculoskeletal: Normal range of motion, Minimal peripheral edema.   Neurological: She is alert  and oriented to person, place, and time. Coordination normal.   Skin: Skin is warm and dry. She is not diaphoretic.  Psychiatric: She has a normal mood and affect.        Assessment:  Overall doing well.   33 y.o., at 39w1d Gestation   Patient Active Problem List   Diagnosis    Vaginal bleeding affecting early pregnancy    Uterine fibroids affecting pregnancy in first trimester    Threatened miscarriage in early pregnancy     Current Outpatient Medications on File Prior to Visit   Medication Sig Dispense Refill    PRENATAL 28 mg iron- 800 mcg Tab Take 1 tablet by mouth.       No current facility-administered medications on file prior to visit.       39 weeks gestation of pregnancy    Excessive fetal growth affecting management of pregnancy in third trimester, single or unspecified fetus    Elevated BP without diagnosis of hypertension         Plan:  Overall doing well    Sent to L&D for evaluation.  Weeks, bleeding/pain precautions, kick counts, labor precautions discussed.

## 2023-12-13 PROCEDURE — 59409 OBSTETRICAL CARE: CPT | Mod: TH,,, | Performed by: STUDENT IN AN ORGANIZED HEALTH CARE EDUCATION/TRAINING PROGRAM

## 2023-12-13 PROCEDURE — 59409 PR OBSTETRICAL CARE,VAG DELIV ONLY: ICD-10-PCS | Mod: TH,,, | Performed by: STUDENT IN AN ORGANIZED HEALTH CARE EDUCATION/TRAINING PROGRAM
